# Patient Record
Sex: MALE | Race: WHITE | NOT HISPANIC OR LATINO | Employment: OTHER | URBAN - METROPOLITAN AREA
[De-identification: names, ages, dates, MRNs, and addresses within clinical notes are randomized per-mention and may not be internally consistent; named-entity substitution may affect disease eponyms.]

---

## 2017-08-17 RX ORDER — DULOXETIN HYDROCHLORIDE 30 MG/1
CAPSULE, DELAYED RELEASE ORAL
Qty: 30 CAPSULE | Refills: 11 | Status: SHIPPED | OUTPATIENT
Start: 2017-08-17 | End: 2018-08-09 | Stop reason: SDUPTHER

## 2017-10-26 RX ORDER — BACLOFEN 10 MG/1
TABLET ORAL
Qty: 90 TABLET | Refills: 4 | Status: SHIPPED | OUTPATIENT
Start: 2017-10-26 | End: 2018-08-09 | Stop reason: SDUPTHER

## 2018-02-06 ENCOUNTER — TELEPHONE (OUTPATIENT)
Dept: NEUROLOGY | Facility: CLINIC | Age: 68
End: 2018-02-06

## 2018-02-06 DIAGNOSIS — G62.9 NEUROPATHY: ICD-10-CM

## 2018-02-06 DIAGNOSIS — M21.379 FOOT-DROP, UNSPECIFIED LATERALITY: Primary | ICD-10-CM

## 2018-02-06 NOTE — TELEPHONE ENCOUNTER
----- Message from Daniela Cross sent at 2018 11:15 AM CST -----  Contact: Salma/spouse  Lupillo Caldera Sr.  MRN: [id number]  : 1950  PCP: John Cross  Home Phone      [phone]  Work Phone      Not on file.  Mobile          [phone]      MESSAGE: The patient's spouse is requesting an order for a leg brace for the patient. She states the brace he had broke and he cannot walk without it. The order needs to be sent to [facility] fax:[phone].  [phone]

## 2018-02-06 NOTE — TELEPHONE ENCOUNTER
Patient needs an order for a new brace, he has a follow up appointment with Rosy tomorrow he can  the script then.

## 2018-02-07 ENCOUNTER — OFFICE VISIT (OUTPATIENT)
Dept: NEUROLOGY | Facility: CLINIC | Age: 68
End: 2018-02-07
Payer: MEDICARE

## 2018-02-07 VITALS
HEART RATE: 84 BPM | SYSTOLIC BLOOD PRESSURE: 132 MMHG | BODY MASS INDEX: 34.56 KG/M2 | RESPIRATION RATE: 16 BRPM | WEIGHT: 241.38 LBS | HEIGHT: 70 IN | DIASTOLIC BLOOD PRESSURE: 76 MMHG

## 2018-02-07 DIAGNOSIS — M54.16 LUMBAR RADICULOPATHY: Primary | ICD-10-CM

## 2018-02-07 DIAGNOSIS — G62.9 NEUROPATHY: ICD-10-CM

## 2018-02-07 DIAGNOSIS — M21.371 RIGHT FOOT DROP: ICD-10-CM

## 2018-02-07 PROBLEM — M21.379 FOOT DROP: Status: ACTIVE | Noted: 2018-02-07

## 2018-02-07 PROCEDURE — 99213 OFFICE O/P EST LOW 20 MIN: CPT | Mod: PBBFAC | Performed by: NURSE PRACTITIONER

## 2018-02-07 PROCEDURE — 99214 OFFICE O/P EST MOD 30 MIN: CPT | Mod: S$PBB | Performed by: NURSE PRACTITIONER

## 2018-02-07 PROCEDURE — 99999 PR PBB SHADOW E&M-EST. PATIENT-LVL III: CPT | Mod: PBBFAC,,, | Performed by: NURSE PRACTITIONER

## 2018-02-07 PROCEDURE — 99999 PR STA SHADOW: CPT | Mod: PBBFAC,,, | Performed by: NURSE PRACTITIONER

## 2018-02-07 NOTE — PROGRESS NOTES
HPI :  Lupillo Caldera Sr. is a 66 y.o. 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 Some component of RLS. 2011 MRI  L spine does show some NF narrowing but not significant stenosis.    He presents today for a follow up visit and to obtain a new AFO, as his AFO is broken. He denies any worsening to his right foot drop. He continues to receive mild relief from his lumbar complaints with Cymbalta, as well as his neuropathy, and Baclofen continues to help his RLS type complaints.     No right CTS complaints today.     Review of Systems   Constitutional: Negative for fever.   Eyes: Negative for double vision.   Respiratory: Negative for hemoptysis.    Cardiovascular: Negative for leg swelling.   Gastrointestinal: Negative for blood in stool.   Genitourinary: Negative for hematuria.   Musculoskeletal: Positive for back pain. Negative for falls.   Skin: Negative for rash.   Neurological: Positive for sensory change. Negative for dizziness and headaches.   Psychiatric/Behavioral: Negative for memory loss.     Exam:   Gen Appearance, well developed/nourished in no apparent distress  CV: 2+ distal pulses with no edema or swelling  Neuro:  MS: Awake, alert, Sustains attention. Recent/remote memory intact, Language is full to spontaneous speech/comprehension. Fund of Knowledge is full  CN: Optic discs are flat with normal vasculature, PERRL, Extraoccular movements and visual fields are full. Normal facial sensation and strength, Tongue and Palate are midline and strong. Shoulder Shrug symmetric and strong.  Motor: Normal bulk, tone decreaed in the right lower leg muscles, no abnormal movements. 5/5 strength bilateral upper/lower extremities with 2+ reflexes except 3/5 right foot and toe extensors (improved with gravity only to 4+/5) only and absent  right ankle jerks and no clonus  Sensation:  Intact to temp and and vibration  Gait: Normal stance, no ataxia; no AFO today-broken    MRI L spine 2016:  Status post L4-L5 fusion.  Lumbar spondylosis as above with moderate right neuroforaminal narrowing at L4-5.    EMG 2016:   1. Mild Right Carpal Tunnel Syndrome  2. Chronic Lumbar Radiculopathy at L5 on the right  3. 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      Labs:   2016 CMP, CBC, TSH, B12, SPEP/FRITZ: Unremarkable except mild elevation in fasting glucose (108)  12/2016 fasting glucose 99; HgA1c 5.1    Assessment/Recommendation: Lupillo Caldera Sr. is a 67 y.o. 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 Some component of RLS. 2011 MRI  L spine does show some NF narrowing but not significant stenosis.    At the last visit 2016: new right ankle/foot numbness and new left leg leg radicular symptoms. 2016 MRI L spine showed spondylosis and NF narrowing. 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     I recommend:  1. Rx written today for new AFO for foot drop.   2. Continue Cymbalta for radicular complaints and neuropathic complaints.   3. Continue Baclofen for RLS complaints.   4. He reports failing left CTR prior/ no specific treatment needed for right CTS at this time.  5. Continue baclofen at night.  This helps with muscle spasms and RLS.  Switching statin (with another provider) also helped prior.     RTC in 6 months

## 2018-08-09 ENCOUNTER — OFFICE VISIT (OUTPATIENT)
Dept: NEUROLOGY | Facility: CLINIC | Age: 68
End: 2018-08-09
Payer: MEDICARE

## 2018-08-09 VITALS
HEIGHT: 70 IN | SYSTOLIC BLOOD PRESSURE: 136 MMHG | WEIGHT: 245.81 LBS | BODY MASS INDEX: 35.19 KG/M2 | DIASTOLIC BLOOD PRESSURE: 76 MMHG | RESPIRATION RATE: 16 BRPM | HEART RATE: 78 BPM

## 2018-08-09 DIAGNOSIS — M21.371 RIGHT FOOT DROP: ICD-10-CM

## 2018-08-09 DIAGNOSIS — M54.16 LUMBAR RADICULOPATHY: Primary | ICD-10-CM

## 2018-08-09 DIAGNOSIS — M79.604 RIGHT LEG PAIN: ICD-10-CM

## 2018-08-09 DIAGNOSIS — G25.81 RESTLESS LEG SYNDROME: ICD-10-CM

## 2018-08-09 DIAGNOSIS — G62.9 NEUROPATHY: ICD-10-CM

## 2018-08-09 PROCEDURE — 99999 PR PBB SHADOW E&M-EST. PATIENT-LVL III: CPT | Mod: PBBFAC,,, | Performed by: NURSE PRACTITIONER

## 2018-08-09 PROCEDURE — 99213 OFFICE O/P EST LOW 20 MIN: CPT | Mod: PBBFAC | Performed by: NURSE PRACTITIONER

## 2018-08-09 PROCEDURE — 99214 OFFICE O/P EST MOD 30 MIN: CPT | Mod: S$PBB | Performed by: NURSE PRACTITIONER

## 2018-08-09 PROCEDURE — 99999 PR STA SHADOW: CPT | Mod: PBBFAC,,, | Performed by: NURSE PRACTITIONER

## 2018-08-09 RX ORDER — DULOXETIN HYDROCHLORIDE 30 MG/1
30 CAPSULE, DELAYED RELEASE ORAL DAILY
Qty: 30 CAPSULE | Refills: 11 | Status: SHIPPED | OUTPATIENT
Start: 2018-08-09 | End: 2019-07-09 | Stop reason: SDUPTHER

## 2018-08-09 RX ORDER — BACLOFEN 10 MG/1
TABLET ORAL
Qty: 90 TABLET | Refills: 3 | Status: SHIPPED | OUTPATIENT
Start: 2018-08-09 | End: 2019-07-09 | Stop reason: SDUPTHER

## 2018-08-09 RX ORDER — CHOLECALCIFEROL (VITAMIN D3) 125 MCG
2 CAPSULE ORAL DAILY PRN
COMMUNITY
End: 2022-08-18

## 2018-08-09 NOTE — PROGRESS NOTES
HPI :  Lupillo Caldera Sr. is a 66 y.o. 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 Some component of RLS. 2011 MRI  L spine does show some NF narrowing but not significant stenosis.    He presents today for a follow up visit. He obtained a new AFO since his last visit, which is helpful. Foot drop overall unchanged. He has some lumbar tension, and would like to work on strengthening his lower back and core with PT. He also continues with right leg pain.     Baclofen continues to help his RLS type complaints.     Bilateral CTS complaints today. He is not dropping items, but difficulty opening items at times.     Review of Systems   Constitutional: Negative for fever.   Eyes: Negative for double vision.   Respiratory: Negative for hemoptysis.    Cardiovascular: Negative for leg swelling.   Gastrointestinal: Negative for blood in stool.   Genitourinary: Negative for hematuria.   Musculoskeletal: Positive for back pain. Negative for falls.   Skin: Negative for rash.   Neurological: Positive for sensory change. Negative for dizziness and headaches.   Psychiatric/Behavioral: Negative for memory loss.     Exam:   Gen Appearance, well developed/nourished in no apparent distress  CV: 2+ distal pulses with no edema or swelling  Neuro:  MS: Awake, alert, Sustains attention. Recent/remote memory intact, Language is full to spontaneous speech/comprehension. Fund of Knowledge is full  CN: Optic discs are flat with normal vasculature, PERRL, Extraoccular movements and visual fields are full. Normal facial sensation and strength, Tongue and Palate are midline and strong. Shoulder Shrug symmetric and strong.  Motor: Normal bulk, tone decreaed in the right lower leg muscles, no abnormal movements. 5/5 strength bilateral upper/lower extremities with 2+  reflexes except 3/5 right foot and toe extensors (improved with gravity only to 4+/5) only and absent right ankle jerks and no clonus  Sensation:  Intact to temp and and vibration  Gait: Normal stance, no ataxia; no AFO today-broken    MRI L spine 2016:  Status post L4-L5 fusion.  Lumbar spondylosis as above with moderate right neuroforaminal narrowing at L4-5.    EMG 2016:   1. Mild Right Carpal Tunnel Syndrome  2. Chronic Lumbar Radiculopathy at L5 on the right  3. 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      Labs:   2016 CMP, CBC, TSH, B12, SPEP/FRITZ: Unremarkable except mild elevation in fasting glucose (108)  12/2016 fasting glucose 99; HgA1c 5.1    Assessment/Recommendation: Lupillo Caldera Sr. is a 67 y.o. 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 Some component of RLS. 2011 MRI  L spine does show some NF narrowing but not significant stenosis. In 2016: new right ankle/foot numbness and new left leg leg radicular symptoms. 2016 MRI L spine showed spondylosis and NF narrowing. 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     I recommend:  1. Rx written today for new AFO for foot drop.   2. Continue Cymbalta for radicular complaints and neuropathic complaints.   3. Continue Baclofen for RLS complaints. Switching statin (with another provider) also helped prior.   4. He reports failing left CTR prior, and worsening right CT complaints. CT injections discussed, but he declines, due to fear of needles.     RTC in 6 months

## 2019-03-29 ENCOUNTER — TELEPHONE (OUTPATIENT)
Dept: NEUROLOGY | Facility: CLINIC | Age: 69
End: 2019-03-29

## 2019-03-29 NOTE — TELEPHONE ENCOUNTER
Spoke with patient states that he has noticed that the numbness is worse in his right leg since last visit. Explained to patient that we needed to get him back in for an evaluation for this problem, voiced understanding. Appointment scheduled next week with Rosy.

## 2019-03-29 NOTE — TELEPHONE ENCOUNTER
----- Message from Sudha Ramirez sent at 3/29/2019 10:22 AM CDT -----  Contact: PATIENT  Lupillo Caldera Sr.  MRN: [id number]  : 1950  PCP: Clint De  Home Phone      [phone]  Work Phone      Not on file.  Mobile          [phone]      MESSAGE: Patient states that he is having numbness to his (R) leg that seems to be getting worse.        Phone: [phone]

## 2019-04-04 ENCOUNTER — OFFICE VISIT (OUTPATIENT)
Dept: NEUROLOGY | Facility: CLINIC | Age: 69
End: 2019-04-04
Payer: MEDICARE

## 2019-04-04 VITALS
SYSTOLIC BLOOD PRESSURE: 162 MMHG | HEART RATE: 72 BPM | BODY MASS INDEX: 36.05 KG/M2 | RESPIRATION RATE: 16 BRPM | HEIGHT: 71 IN | DIASTOLIC BLOOD PRESSURE: 82 MMHG | WEIGHT: 257.5 LBS

## 2019-04-04 DIAGNOSIS — R20.0 RIGHT LEG NUMBNESS: ICD-10-CM

## 2019-04-04 DIAGNOSIS — E78.5 HYPERLIPIDEMIA, UNSPECIFIED HYPERLIPIDEMIA TYPE: ICD-10-CM

## 2019-04-04 DIAGNOSIS — M54.16 LUMBAR RADICULOPATHY: Primary | ICD-10-CM

## 2019-04-04 DIAGNOSIS — M21.371 RIGHT FOOT DROP: ICD-10-CM

## 2019-04-04 DIAGNOSIS — G62.9 NEUROPATHY: ICD-10-CM

## 2019-04-04 DIAGNOSIS — G25.81 RESTLESS LEG SYNDROME: ICD-10-CM

## 2019-04-04 DIAGNOSIS — M79.604 RIGHT LEG PAIN: ICD-10-CM

## 2019-04-04 DIAGNOSIS — I10 HYPERTENSION, UNSPECIFIED TYPE: ICD-10-CM

## 2019-04-04 PROCEDURE — 99999 PR PBB SHADOW E&M-EST. PATIENT-LVL V: CPT | Mod: PBBFAC,,, | Performed by: NURSE PRACTITIONER

## 2019-04-04 PROCEDURE — 99999 PR PBB SHADOW E&M-EST. PATIENT-LVL V: ICD-10-PCS | Mod: PBBFAC,,, | Performed by: NURSE PRACTITIONER

## 2019-04-04 PROCEDURE — 3079F PR MOST RECENT DIASTOLIC BLOOD PRESSURE 80-89 MM HG: ICD-10-PCS | Mod: CPTII,S$GLB,, | Performed by: NURSE PRACTITIONER

## 2019-04-04 PROCEDURE — 3077F PR MOST RECENT SYSTOLIC BLOOD PRESSURE >= 140 MM HG: ICD-10-PCS | Mod: CPTII,S$GLB,, | Performed by: NURSE PRACTITIONER

## 2019-04-04 PROCEDURE — 3077F SYST BP >= 140 MM HG: CPT | Mod: CPTII,S$GLB,, | Performed by: NURSE PRACTITIONER

## 2019-04-04 PROCEDURE — 99214 PR OFFICE/OUTPT VISIT, EST, LEVL IV, 30-39 MIN: ICD-10-PCS | Mod: S$GLB,,, | Performed by: NURSE PRACTITIONER

## 2019-04-04 PROCEDURE — 99214 OFFICE O/P EST MOD 30 MIN: CPT | Mod: S$GLB,,, | Performed by: NURSE PRACTITIONER

## 2019-04-04 PROCEDURE — 3079F DIAST BP 80-89 MM HG: CPT | Mod: CPTII,S$GLB,, | Performed by: NURSE PRACTITIONER

## 2019-04-04 RX ORDER — DIAZEPAM 2 MG/1
2 TABLET ORAL SEE ADMIN INSTRUCTIONS
Qty: 2 TABLET | Refills: 0 | Status: SHIPPED | OUTPATIENT
Start: 2019-04-04 | End: 2019-07-09

## 2019-04-04 RX ORDER — LISINOPRIL 20 MG/1
20 TABLET ORAL DAILY
COMMUNITY
Start: 2019-03-18 | End: 2023-11-15

## 2019-04-04 NOTE — PROGRESS NOTES
HPI :  Lupillo Caldera Sr. is a 68 y.o. 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 Some component of RLS. 2011 MRI  L spine does show some NF narrowing but not significant stenosis.    He presents today with complaint of worsening pain and paresthesias to his RLE since returning to work one month ago. He does carpentry work, and stands up all day cutting wood. When he changes positions or sits, his RLE complaints resolve. No lumbar pain at this time. He also has RLE complaints after walking for a prolonged period of time, and is unable to grocery shop with his wife currently because of this. Last MRI L-spine in 2016.     He was referred to PT at his last visit for lumbar tension and desire for core strengthening, but he was unable to proceed at that time, due to insurance coverage/cost; however, he has new insurance coverage now.     He obtained a new AFO since his last visit, which is helpful. Foot drop overall unchanged.     Baclofen continues to help his RLS type complaints.     Bilateral CTS complaints ongoing. He is not dropping items, but difficulty opening items at times. Declines CT injection.     BP in 160's today, despite medication compliance.     Review of Systems   Constitutional: Negative for fever.   Eyes: Negative for double vision.   Respiratory: Negative for hemoptysis.    Cardiovascular: Negative for leg swelling.   Gastrointestinal: Negative for blood in stool.   Genitourinary: Negative for hematuria.   Musculoskeletal: Positive for back pain. Negative for falls.   Skin: Negative for rash.   Neurological: Positive for sensory change. Negative for dizziness and headaches.   Psychiatric/Behavioral: Negative for memory loss.     Exam:   Gen Appearance, well developed/nourished in no apparent distress  CV: 2+ distal pulses  with no edema or swelling  Neuro:  MS: Awake, alert, Sustains attention. Recent/remote memory intact, Language is full to spontaneous speech/comprehension. Fund of Knowledge is full  CN: Optic discs are flat with normal vasculature, PERRL, Extraoccular movements and visual fields are full. Normal facial sensation and strength, Tongue and Palate are midline and strong. Shoulder Shrug symmetric and strong.  Motor: Normal bulk, tone decreaed in the right lower leg muscles, no abnormal movements. 5/5 strength bilateral upper/lower extremities, except 3/5 right foot and toe extensors (improved with gravity only to 4+/5) with 1+ reflexes and absent right ankle jerks and no clonus  Sensation:  Intact to temp and and vibration, except reduced to median nerve distribution bilaterally.   Gait: Normal stance, no ataxia; no AFO today-broken    Imaging:  MRI L spine 2016:  Status post L4-L5 fusion.  Lumbar spondylosis as above with moderate right neuroforaminal narrowing at L4-5.    EMG 2016:   1. Mild Right Carpal Tunnel Syndrome  2. Chronic Lumbar Radiculopathy at L5 on the right  3. 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      Labs:   2016 CMP, CBC, TSH, B12, SPEP/FRITZ: Unremarkable except mild elevation in fasting glucose (108)  12/2016 fasting glucose 99; HgA1c 5.1    Assessment/Recommendation: Lupillo Caldera Sr. is a 68 y.o. 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 Some component of RLS. 2011 MRI  L spine does show some NF narrowing but not significant stenosis. In 2016: new right ankle/foot numbness and new left leg leg radicular symptoms. 2016 MRI L spine showed spondylosis and NF narrowing. 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     I recommend:  1. Update MRI L-spine, then consider PT versus pain management referral afterwards. Complaints in right L5/S1 distribution. Valium Rx given.   2. He continues compliance with right AFO for right foot drop.   3. Continue Cymbalta for radicular complaints and neuropathic complaints.   4. Continue Baclofen for RLS complaints. Switching statin (with another provider) also helped prior.   5. He reports failing left CTR prior, and has worsening right CT complaints. CT injections discussed, but he declines, due to fear of needles. Encouraged bracing.   6. Advised to take BP at home bid for 2 weeks then see Cardiology for any ongoing BP above 140's. Medication recently changed, due to side effect of sweating.     RTC in 6 months

## 2019-04-04 NOTE — PATIENT INSTRUCTIONS
Take blood pressure at home twice a day for 2 weeks, and report any ongoing BP over 140's to Cardiology.

## 2019-04-10 ENCOUNTER — HOSPITAL ENCOUNTER (OUTPATIENT)
Dept: RADIOLOGY | Facility: HOSPITAL | Age: 69
Discharge: HOME | End: 2019-04-10
Attending: NURSE PRACTITIONER
Payer: MEDICARE

## 2019-04-10 DIAGNOSIS — R20.0 RIGHT LEG NUMBNESS: ICD-10-CM

## 2019-04-10 DIAGNOSIS — M79.604 RIGHT LEG PAIN: ICD-10-CM

## 2019-04-10 DIAGNOSIS — M54.16 LUMBAR RADICULOPATHY: ICD-10-CM

## 2019-04-10 PROCEDURE — 72148 MRI LUMBAR SPINE W/O DYE: CPT | Mod: TC

## 2019-04-10 PROCEDURE — 72148 MRI LUMBAR SPINE WITHOUT CONTRAST: ICD-10-PCS | Mod: 26,,, | Performed by: RADIOLOGY

## 2019-04-10 PROCEDURE — 72148 MRI LUMBAR SPINE W/O DYE: CPT | Mod: 26,,, | Performed by: RADIOLOGY

## 2019-04-11 DIAGNOSIS — M54.16 LUMBAR RADICULOPATHY: Primary | ICD-10-CM

## 2019-05-20 ENCOUNTER — TELEPHONE (OUTPATIENT)
Dept: NEUROLOGY | Facility: CLINIC | Age: 69
End: 2019-05-20

## 2019-05-20 DIAGNOSIS — M79.604 RIGHT LEG PAIN: ICD-10-CM

## 2019-05-20 DIAGNOSIS — M54.16 LUMBAR RADICULOPATHY: Primary | ICD-10-CM

## 2019-05-20 NOTE — TELEPHONE ENCOUNTER
----- Message from Valeri Osullivan sent at 2019 10:22 AM CDT -----  Contact: Self  Lupillo Caldera Sr.  MRN: [id number]  : 1950  PCP: Clint De  Home Phone      [phone]  Work Phone      Not on file.  Mobile          [phone]      MESSAGE: Patient states he does not want to see Pain management, he would like to try Physical therapy instead, he would like to know if that would be an option for him. Please advise    Phone: [phone]

## 2019-07-09 ENCOUNTER — OFFICE VISIT (OUTPATIENT)
Dept: NEUROLOGY | Facility: CLINIC | Age: 69
End: 2019-07-09
Payer: MEDICARE

## 2019-07-09 VITALS
HEIGHT: 70 IN | DIASTOLIC BLOOD PRESSURE: 74 MMHG | RESPIRATION RATE: 16 BRPM | HEART RATE: 92 BPM | SYSTOLIC BLOOD PRESSURE: 130 MMHG | WEIGHT: 246.25 LBS | BODY MASS INDEX: 35.25 KG/M2

## 2019-07-09 DIAGNOSIS — M54.16 LUMBAR RADICULOPATHY: Primary | ICD-10-CM

## 2019-07-09 DIAGNOSIS — I10 HYPERTENSION, UNSPECIFIED TYPE: ICD-10-CM

## 2019-07-09 DIAGNOSIS — R20.0 RIGHT LEG NUMBNESS: ICD-10-CM

## 2019-07-09 DIAGNOSIS — M21.371 RIGHT FOOT DROP: ICD-10-CM

## 2019-07-09 DIAGNOSIS — M18.11 OSTEOARTHRITIS OF RIGHT THUMB: ICD-10-CM

## 2019-07-09 DIAGNOSIS — M79.604 RIGHT LEG PAIN: ICD-10-CM

## 2019-07-09 DIAGNOSIS — E78.5 HYPERLIPIDEMIA, UNSPECIFIED HYPERLIPIDEMIA TYPE: ICD-10-CM

## 2019-07-09 DIAGNOSIS — G25.81 RESTLESS LEG SYNDROME: ICD-10-CM

## 2019-07-09 DIAGNOSIS — G62.9 NEUROPATHY: ICD-10-CM

## 2019-07-09 PROCEDURE — 99214 PR OFFICE/OUTPT VISIT, EST, LEVL IV, 30-39 MIN: ICD-10-PCS | Mod: S$GLB,,, | Performed by: NURSE PRACTITIONER

## 2019-07-09 PROCEDURE — 3075F SYST BP GE 130 - 139MM HG: CPT | Mod: CPTII,S$GLB,, | Performed by: NURSE PRACTITIONER

## 2019-07-09 PROCEDURE — 99999 PR PBB SHADOW E&M-EST. PATIENT-LVL III: ICD-10-PCS | Mod: PBBFAC,,, | Performed by: NURSE PRACTITIONER

## 2019-07-09 PROCEDURE — 99499 RISK ADDL DX/OHS AUDIT: ICD-10-PCS | Mod: S$GLB,,, | Performed by: NURSE PRACTITIONER

## 2019-07-09 PROCEDURE — 99999 PR PBB SHADOW E&M-EST. PATIENT-LVL III: CPT | Mod: PBBFAC,,, | Performed by: NURSE PRACTITIONER

## 2019-07-09 PROCEDURE — 99499 UNLISTED E&M SERVICE: CPT | Mod: S$GLB,,, | Performed by: NURSE PRACTITIONER

## 2019-07-09 PROCEDURE — 3075F PR MOST RECENT SYSTOLIC BLOOD PRESS GE 130-139MM HG: ICD-10-PCS | Mod: CPTII,S$GLB,, | Performed by: NURSE PRACTITIONER

## 2019-07-09 PROCEDURE — 3078F DIAST BP <80 MM HG: CPT | Mod: CPTII,S$GLB,, | Performed by: NURSE PRACTITIONER

## 2019-07-09 PROCEDURE — 3078F PR MOST RECENT DIASTOLIC BLOOD PRESSURE < 80 MM HG: ICD-10-PCS | Mod: CPTII,S$GLB,, | Performed by: NURSE PRACTITIONER

## 2019-07-09 PROCEDURE — 99214 OFFICE O/P EST MOD 30 MIN: CPT | Mod: S$GLB,,, | Performed by: NURSE PRACTITIONER

## 2019-07-09 RX ORDER — BACLOFEN 10 MG/1
TABLET ORAL
Qty: 90 TABLET | Refills: 1 | Status: SHIPPED | OUTPATIENT
Start: 2019-07-09 | End: 2020-04-28

## 2019-07-09 RX ORDER — DULOXETIN HYDROCHLORIDE 30 MG/1
30 CAPSULE, DELAYED RELEASE ORAL DAILY
Qty: 90 CAPSULE | Refills: 1 | Status: SHIPPED | OUTPATIENT
Start: 2019-07-09 | End: 2020-03-26

## 2019-07-09 RX ORDER — DICLOFENAC SODIUM 10 MG/G
2 GEL TOPICAL DAILY
Qty: 1 TUBE | Refills: 5 | Status: SHIPPED | OUTPATIENT
Start: 2019-07-09 | End: 2022-08-18

## 2019-07-09 NOTE — PROGRESS NOTES
HPI :  Lupillo Caldera Sr. is a 68 y.o. 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 Some component of RLS. 2011 MRI  L spine does show some NF narrowing but not significant stenosis.    He presents today for a follow up visit. He was evaluated for worsening right lumbar radicular complaints at his last visit, and updated his MRI L-spine in 4/2019, which was essentially unchanged from his last study. He was referred to pain management, but decided not to proceed with this. He attended PT at [facility], which was very helpful, and his right lumbar complaints are resolved.     He lost 11 pounds since his last visit.     Foot drop overall unchanged. He continues to wear an AFO.     Baclofen continues to help his RLS type complaints.     Cymbalta continues to help his neuropathic complaints.     Bilateral CTS complaints ongoing. He is not dropping items, but difficulty opening items at times. Declines CT injection.     He sometimes has pain to his right thumb near his wrist, worse with increased working. His right thumb feels stiff at times-no numbness.     BP improved today-WNL.     Review of Systems   Constitutional: Negative for fever.   Eyes: Negative for double vision.   Respiratory: Negative for hemoptysis.    Cardiovascular: Negative for leg swelling.   Gastrointestinal: Negative for blood in stool.   Genitourinary: Negative for hematuria.   Musculoskeletal: Positive for joint pain. Negative for back pain and falls.   Skin: Negative for rash.   Neurological: Positive for sensory change. Negative for dizziness and headaches.   Psychiatric/Behavioral: Negative for memory loss.     Exam:   Gen Appearance, well developed/nourished in no apparent distress  CV: 2+ distal pulses with no edema or swelling  Neuro:  MS: Awake, alert,  Sustains attention. Recent/remote memory intact, Language is full to spontaneous speech/comprehension. Fund of Knowledge is full  CN: Optic discs are flat with normal vasculature, PERRL, Extraoccular movements and visual fields are full. Normal facial sensation and strength, Tongue and Palate are midline and strong. Shoulder Shrug symmetric and strong.  Motor: Normal bulk, tone decreaed in the right lower leg muscles, no abnormal movements. 5/5 strength bilateral upper/lower extremities, except 3/5 right foot and toe extensors (improved with gravity only to 4+/5) with 1+ reflexes and absent right ankle jerks and no clonus  Sensation:  Intact to temp and and vibration, except reduced to median nerve distribution bilaterally.   Gait: Normal stance, no ataxia; no AFO today-broken    Imaging:  MRI L-spine 4/2019:   Status post instrumented fusion of L4-L5.  Osseous fusion noted across the facet joints.    Alignment: Grade 1 anterolisthesis noted at L4-L5, unchanged.    Vertebrae: Normal marrow signal, noting several hemangiomas.  No fracture.    Discs: Multilevel disc desiccation noted.    Cord: Normal.  Conus terminates at L1-L2.    Degenerative findings:    T12-L1: No spinal canal stenosis or neural foraminal narrowing.    L1-L2: No spinal canal stenosis or neural foraminal narrowing.    L2-L3: Circumferential disc bulge and mild bilateral facet arthropathy noted.  No spinal canal stenosis or neural foraminal narrowing.    L3-L4: Circumferential disc bulge and mild bilateral facet arthropathy result in mild spinal canal stenosis.    L4-L5: Status post discectomy and fusion.  Moderate narrowing of the right neural foramen noted.    L5-S1: Circumferential disc bulge and mild bilateral facet arthropathy noted.  No spinal canal stenosis or neural foraminal narrowing.    Paraspinal muscles & soft tissues: Peripelvic left renal cyst noted.      Impression       1. Status post L4-L5 fusion.  Multilevel degenerative changes  of the lumbar spine overall similar to prior study.     MRI L spine 2016:  Status post L4-L5 fusion.  Lumbar spondylosis as above with moderate right neuroforaminal narrowing at L4-5.    EMG 2016:   1. Mild Right Carpal Tunnel Syndrome  2. Chronic Lumbar Radiculopathy at L5 on the right  3. 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      Labs:   2016 CMP, CBC, TSH, B12, SPEP/FRITZ: Unremarkable except mild elevation in fasting glucose (108)  12/2016 fasting glucose 99; HgA1c 5.1    Assessment/Recommendation: Lupillo Caldera Sr. is a 68 y.o. 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 Some component of RLS. 2011 MRI  L spine does show some NF narrowing but not significant stenosis. In 2016: new right ankle/foot numbness and new left leg leg radicular symptoms. 2016 MRI L spine showed spondylosis and NF narrowing. 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     I recommend:  1. Start Voltaren gel for right thumb pain, which I suspect to be OA related. This is not consistent with right CTS.   2. Updated MRI L-spine unchanged from prior study. His right lumbar complaints resolved with PT per Physiblanca Gibbons.   3. He continues compliance with right AFO for right foot drop. Rx written for new AFO today.   4. Continue Cymbalta for radicular complaints and neuropathic complaints.   5. Continue Baclofen for RLS complaints. Switching statin (with another provider) also helped prior.   6. He reports failing left CTR prior.  7. Advised to take BP at home bid for 2 weeks then see Cardiology for any  ongoing BP above 140's. Medication recently changed, due to side effect of sweating.     RTC in 6 months

## 2019-07-09 NOTE — PROGRESS NOTES
Patient, Lupillo Caldera Sr. (MRN #[id number]), presented with a recorded BMI of 35.33 kg/m^2 and a documented comorbidity(s):  - Hypertension  - Hyperlipidemia  to which the severe obesity is a contributing factor. This is consistent with the definition of severe obesity (BMI 35.0-39.9) with comorbidity (ICD-10 E66.01, Z68.35). The patient's severe obesity was monitored, evaluated, addressed and/or treated. This addendum to the medical record is made on 07/09/2019.

## 2019-11-18 ENCOUNTER — TELEPHONE (OUTPATIENT)
Dept: NEUROLOGY | Facility: CLINIC | Age: 69
End: 2019-11-18

## 2019-11-18 DIAGNOSIS — M21.371 RIGHT FOOT DROP: Primary | ICD-10-CM

## 2019-11-18 NOTE — TELEPHONE ENCOUNTER
----- Message from Sudha Ramirez sent at 2019 12:57 PM CST -----  Contact: WIFE - SEA Caldera Sr.  MRN: [id number]  : 1950  PCP: Clint De  Home Phone      [phone]  Work Phone      Not on file.  Mobile          [phone]      MESSAGE: Wife is checking to see if patient needs an appointment to get a new brace for his leg.  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        Phone: [phone]

## 2019-11-18 NOTE — TELEPHONE ENCOUNTER
Patient needs order for new AFO. He is scheduled for 6 month follow up in January 2020. Please advise if an appointment is needed for the order.

## 2019-11-19 ENCOUNTER — TELEPHONE (OUTPATIENT)
Dept: NEUROLOGY | Facility: CLINIC | Age: 69
End: 2019-11-19

## 2019-11-19 NOTE — TELEPHONE ENCOUNTER
Orders placed for new AFO. We can try just sending the order first, before scheduling another follow up visit just for this.

## 2019-11-19 NOTE — TELEPHONE ENCOUNTER
----- Message from Teodora Apodaca sent at 2019 10:18 AM CST -----  Contact: tray - wife  Lupillo Caldera Sr.  MRN: [id number]  : 1950  PCP: Clint De  Home Phone      [phone]  Work Phone      Not on file.  Mobile          [phone]      MESSAGE:  Wife said that she knows a referral was sent in (not sure whats the company name) for the patient's brace. However, they need his insurance info from us as well as a note saying that he needs & why he needs the brace.   Fax: [phone] (company fax)   Attn: Barbie       [phone]

## 2020-01-13 ENCOUNTER — OFFICE VISIT (OUTPATIENT)
Dept: NEUROLOGY | Facility: CLINIC | Age: 70
End: 2020-01-13
Payer: MEDICARE

## 2020-01-13 VITALS
HEIGHT: 71 IN | DIASTOLIC BLOOD PRESSURE: 72 MMHG | RESPIRATION RATE: 18 BRPM | BODY MASS INDEX: 34.48 KG/M2 | SYSTOLIC BLOOD PRESSURE: 116 MMHG | WEIGHT: 246.25 LBS | HEART RATE: 70 BPM

## 2020-01-13 DIAGNOSIS — G62.9 NEUROPATHY: ICD-10-CM

## 2020-01-13 DIAGNOSIS — G25.81 RESTLESS LEG SYNDROME: ICD-10-CM

## 2020-01-13 DIAGNOSIS — M21.371 RIGHT FOOT DROP: ICD-10-CM

## 2020-01-13 DIAGNOSIS — M54.16 LUMBAR RADICULOPATHY: Primary | ICD-10-CM

## 2020-01-13 PROCEDURE — 99999 PR PBB SHADOW E&M-EST. PATIENT-LVL III: ICD-10-PCS | Mod: PBBFAC,,, | Performed by: PSYCHIATRY & NEUROLOGY

## 2020-01-13 PROCEDURE — 3078F DIAST BP <80 MM HG: CPT | Mod: CPTII,S$GLB,, | Performed by: PSYCHIATRY & NEUROLOGY

## 2020-01-13 PROCEDURE — 1159F PR MEDICATION LIST DOCUMENTED IN MEDICAL RECORD: ICD-10-PCS | Mod: S$GLB,,, | Performed by: PSYCHIATRY & NEUROLOGY

## 2020-01-13 PROCEDURE — 99214 OFFICE O/P EST MOD 30 MIN: CPT | Mod: S$GLB,,, | Performed by: PSYCHIATRY & NEUROLOGY

## 2020-01-13 PROCEDURE — 3074F SYST BP LT 130 MM HG: CPT | Mod: CPTII,S$GLB,, | Performed by: PSYCHIATRY & NEUROLOGY

## 2020-01-13 PROCEDURE — 99999 PR PBB SHADOW E&M-EST. PATIENT-LVL III: CPT | Mod: PBBFAC,,, | Performed by: PSYCHIATRY & NEUROLOGY

## 2020-01-13 PROCEDURE — 3074F PR MOST RECENT SYSTOLIC BLOOD PRESSURE < 130 MM HG: ICD-10-PCS | Mod: CPTII,S$GLB,, | Performed by: PSYCHIATRY & NEUROLOGY

## 2020-01-13 PROCEDURE — 1126F AMNT PAIN NOTED NONE PRSNT: CPT | Mod: S$GLB,,, | Performed by: PSYCHIATRY & NEUROLOGY

## 2020-01-13 PROCEDURE — 99214 PR OFFICE/OUTPT VISIT, EST, LEVL IV, 30-39 MIN: ICD-10-PCS | Mod: S$GLB,,, | Performed by: PSYCHIATRY & NEUROLOGY

## 2020-01-13 PROCEDURE — 1159F MED LIST DOCD IN RCRD: CPT | Mod: S$GLB,,, | Performed by: PSYCHIATRY & NEUROLOGY

## 2020-01-13 PROCEDURE — 3078F PR MOST RECENT DIASTOLIC BLOOD PRESSURE < 80 MM HG: ICD-10-PCS | Mod: CPTII,S$GLB,, | Performed by: PSYCHIATRY & NEUROLOGY

## 2020-01-13 PROCEDURE — 1126F PR PAIN SEVERITY QUANTIFIED, NO PAIN PRESENT: ICD-10-PCS | Mod: S$GLB,,, | Performed by: PSYCHIATRY & NEUROLOGY

## 2020-01-13 NOTE — PROGRESS NOTES
HPI :  Lupillo Caldera Sr. is a 66 y.o. male     Since the last visit with me, the patient has been seeing NP, Rosy Cross, in Neurology in this clinic  A new AFO was ordered after the last visit  Volteren gel was given for right thumb pain and this helps and this is more tolerable now.    He complains of right foot and ankle burning pain. He feels burning in the great toe all the way up to the ankle. Can happen at any point on the day, but worse with prolonged walking. Left foot has some numbness and there is more constant numbness in the right foot.  No radicular pain and back pain is well tolerated.Brace is fitting well    RLS is well controlled      Review of Systems   Constitutional: Negative for fever.   Eyes: Negative for double vision.   Respiratory: Negative for hemoptysis.    Cardiovascular: Negative for leg swelling.   Gastrointestinal: Negative for blood in stool.   Genitourinary: Negative for hematuria.   Musculoskeletal: Negative for falls.   Skin: Negative for rash.   Neurological: Negative for dizziness and headaches.   Psychiatric/Behavioral: Negative for memory loss.       Exam:   Gen Appearance, well developed/nourished in no apparent distress  CV: 2+ distal pulses with no edema or swelling  Neuro:  MS: Awake, alert, Sustains attention. Recent/remote memory intact, Language is full to spontaneous speech/comprehension. Fund of Knowledge is full  CN: Optic discs are flat with normal vasculature, PERRL, Extraoccular movements and visual fields are full. Normal facial sensation and strength, Tongue and Palate are midline and strong. Shoulder Shrug symmetric and strong.  Motor: Normal bulk, tone decreaed in the right lower leg muscles, no abnormal movements. 5/5 strength bilateral upper/lower extremities with 2+ reflexes except 3/5 right foot and toe extensors (improved with gravity only to 4+/5) (in afo today) only and absent right ankle jerks and no clonus  Sensation:  Intact to temp and and  vibration  Gait: Normal stance, no ataxia with afo in place-- there is no compression on the medial ankle with this brace.       MRI L spine 2016: Status post L4-L5 fusion.  Lumbar spondylosis as above with moderate right neuroforaminal narrowing at L4-5.    EMG 2016: 1. Mild Right Carpal Tunnel Syndrome  2. Chronic Lumbar Radiculopathy at L5 on the right  3. 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        Labs: 2016 CMP, CBC, TSH, B12, SPEP/FRITZ: Unremarkable except mild elevation in fasting glucose (108)  12/2016 fasting glucose improved with normal A1C    Assessment/Recommendation: Lupillo Caldera Sr. is a 69 y.o. male who is s/p 2 lumbar spinal surgeries now with Residual foot drop from right L5 radiduluopathy with ongoing rennervation by  EMG and prominent cramps/spasms in the legs--failed lower back syndrome. Some component of RLS.  2016 MRI L spine showed spondylosis and NF narrowing. 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 Voltaren gel for right thumb pain, which I suspect to be OA related, is helpful. Continue this PRN. This is not consistent with right CTS.   2. His right lumbar complaints resolved with PT per Paulina Gibbons.   3. He continues compliance with right AFO for right foot drop.  4. Continue Cymbalta for radicular complaints and neuropathic complaints.   -Could raise dose of Cymbalta if desired. He declines for now  - Patient reports poor tolerance of gabapentin prior.  5. Continue Baclofen for RLS complaints. Switching statin (with another provider) also helped prior.   6. He reports failing left CTR prior. No specific treatment needed for right CTS at this time   7. Fasting blood sugar abnormality note prior had improved. Advised yearly lab follow up with PCP or  cardiologist    RTC 6 months

## 2020-03-26 DIAGNOSIS — M54.16 LUMBAR RADICULOPATHY: ICD-10-CM

## 2020-03-26 DIAGNOSIS — G62.9 NEUROPATHY: ICD-10-CM

## 2020-03-26 RX ORDER — DULOXETIN HYDROCHLORIDE 30 MG/1
CAPSULE, DELAYED RELEASE ORAL
Qty: 90 CAPSULE | Refills: 1 | Status: SHIPPED | OUTPATIENT
Start: 2020-03-26 | End: 2020-07-07

## 2020-04-26 DIAGNOSIS — G25.81 RESTLESS LEG SYNDROME: ICD-10-CM

## 2020-04-28 RX ORDER — BACLOFEN 10 MG/1
TABLET ORAL
Qty: 90 TABLET | Refills: 0 | Status: SHIPPED | OUTPATIENT
Start: 2020-04-28 | End: 2020-07-28

## 2020-07-13 ENCOUNTER — OFFICE VISIT (OUTPATIENT)
Dept: NEUROLOGY | Facility: CLINIC | Age: 70
End: 2020-07-13
Payer: MEDICARE

## 2020-07-13 VITALS
BODY MASS INDEX: 34.57 KG/M2 | HEART RATE: 78 BPM | WEIGHT: 246.94 LBS | HEIGHT: 71 IN | TEMPERATURE: 97 F | SYSTOLIC BLOOD PRESSURE: 130 MMHG | RESPIRATION RATE: 16 BRPM | DIASTOLIC BLOOD PRESSURE: 76 MMHG

## 2020-07-13 DIAGNOSIS — M25.561 CHRONIC PAIN OF RIGHT KNEE: Primary | ICD-10-CM

## 2020-07-13 DIAGNOSIS — M21.371 RIGHT FOOT DROP: ICD-10-CM

## 2020-07-13 DIAGNOSIS — G25.81 RESTLESS LEG SYNDROME: ICD-10-CM

## 2020-07-13 DIAGNOSIS — G62.9 NEUROPATHY: ICD-10-CM

## 2020-07-13 DIAGNOSIS — M18.11 OSTEOARTHRITIS OF RIGHT THUMB: ICD-10-CM

## 2020-07-13 DIAGNOSIS — G89.29 CHRONIC PAIN OF RIGHT KNEE: Primary | ICD-10-CM

## 2020-07-13 DIAGNOSIS — M54.16 LUMBAR RADICULOPATHY: ICD-10-CM

## 2020-07-13 PROCEDURE — 3075F SYST BP GE 130 - 139MM HG: CPT | Mod: CPTII,S$GLB,, | Performed by: PSYCHIATRY & NEUROLOGY

## 2020-07-13 PROCEDURE — 99999 PR PBB SHADOW E&M-EST. PATIENT-LVL IV: ICD-10-PCS | Mod: PBBFAC,,, | Performed by: PSYCHIATRY & NEUROLOGY

## 2020-07-13 PROCEDURE — 3008F PR BODY MASS INDEX (BMI) DOCUMENTED: ICD-10-PCS | Mod: CPTII,S$GLB,, | Performed by: PSYCHIATRY & NEUROLOGY

## 2020-07-13 PROCEDURE — 99214 PR OFFICE/OUTPT VISIT, EST, LEVL IV, 30-39 MIN: ICD-10-PCS | Mod: S$GLB,,, | Performed by: PSYCHIATRY & NEUROLOGY

## 2020-07-13 PROCEDURE — 3075F PR MOST RECENT SYSTOLIC BLOOD PRESS GE 130-139MM HG: ICD-10-PCS | Mod: CPTII,S$GLB,, | Performed by: PSYCHIATRY & NEUROLOGY

## 2020-07-13 PROCEDURE — 3008F BODY MASS INDEX DOCD: CPT | Mod: CPTII,S$GLB,, | Performed by: PSYCHIATRY & NEUROLOGY

## 2020-07-13 PROCEDURE — 99214 OFFICE O/P EST MOD 30 MIN: CPT | Mod: S$GLB,,, | Performed by: PSYCHIATRY & NEUROLOGY

## 2020-07-13 PROCEDURE — 3078F PR MOST RECENT DIASTOLIC BLOOD PRESSURE < 80 MM HG: ICD-10-PCS | Mod: CPTII,S$GLB,, | Performed by: PSYCHIATRY & NEUROLOGY

## 2020-07-13 PROCEDURE — 1125F PR PAIN SEVERITY QUANTIFIED, PAIN PRESENT: ICD-10-PCS | Mod: S$GLB,,, | Performed by: PSYCHIATRY & NEUROLOGY

## 2020-07-13 PROCEDURE — 1159F MED LIST DOCD IN RCRD: CPT | Mod: S$GLB,,, | Performed by: PSYCHIATRY & NEUROLOGY

## 2020-07-13 PROCEDURE — 99999 PR PBB SHADOW E&M-EST. PATIENT-LVL IV: CPT | Mod: PBBFAC,,, | Performed by: PSYCHIATRY & NEUROLOGY

## 2020-07-13 PROCEDURE — 1125F AMNT PAIN NOTED PAIN PRSNT: CPT | Mod: S$GLB,,, | Performed by: PSYCHIATRY & NEUROLOGY

## 2020-07-13 PROCEDURE — 1159F PR MEDICATION LIST DOCUMENTED IN MEDICAL RECORD: ICD-10-PCS | Mod: S$GLB,,, | Performed by: PSYCHIATRY & NEUROLOGY

## 2020-07-13 PROCEDURE — 3078F DIAST BP <80 MM HG: CPT | Mod: CPTII,S$GLB,, | Performed by: PSYCHIATRY & NEUROLOGY

## 2020-07-13 RX ORDER — DULOXETIN HYDROCHLORIDE 30 MG/1
30 CAPSULE, DELAYED RELEASE ORAL DAILY
Qty: 90 CAPSULE | Refills: 3 | Status: SHIPPED | OUTPATIENT
Start: 2020-07-13 | End: 2021-04-13 | Stop reason: SDUPTHER

## 2020-07-13 NOTE — PROGRESS NOTES
HPI :  Lupillo Caldera Sr. is a 66 y.o. male     He has been having right knee pain for about 2 months more severe than usual. He has a history of knee surgery in 1980s, Dr Hudson Muñoz.  He has not injured the knee recently. Pain is in the anterior and posterior knee joint. Knows of chronic degenerative changes at the right knee joint.     Pain in the right foot and ankle burning pain is active only a little at night. This is tolerable.   No radicular pain and back pain is well tolerated.  Lumbar radicular type pain is not active, back pain is not active  AFO used well    RLS is well controlled  CTS right is not active  Thumb pain is responsive to volteren    Blood sugar has been good per him    Review of Systems   Constitutional: Negative for fever.   Eyes: Negative for double vision.   Respiratory: Negative for hemoptysis.    Cardiovascular: Negative for leg swelling.   Gastrointestinal: Negative for blood in stool.   Genitourinary: Negative for hematuria.   Musculoskeletal: Negative for falls.   Skin: Negative for rash.   Neurological: Negative for dizziness and headaches.   Psychiatric/Behavioral: Negative for memory loss.       Exam:   Gen Appearance, well developed/nourished in no apparent distress  CV: 2+ distal pulses with no edema or swelling  Neuro:  MS: Awake, alert, Sustains attention. Recent/remote memory intact, Language is full to spontaneous speech/comprehension. Fund of Knowledge is full  CN: Optic discs are flat with normal vasculature, PERRL, Extraoccular movements and visual fields are full. Normal facial sensation and strength, Tongue and Palate are midline and strong. Shoulder Shrug symmetric and strong.  Motor: Normal bulk, tone decreaed in the right lower leg muscles, no abnormal movements. 5/5 strength bilateral upper/lower extremities with 2+ reflexes except 3/5 right foot and toe extensors (improved with gravity only to 4+/5) (in afo today) only and absent right ankle jerks and no  clonus  Sensation:  Intact to temp and and vibration  Gait: Normal stance, no ataxia with afo in place-- there is no compression on the medial ankle with this brace.       MRI L spine 2016: Status post L4-L5 fusion.  Lumbar spondylosis as above with moderate right neuroforaminal narrowing at L4-5.    EMG 2016: 1. Mild Right Carpal Tunnel Syndrome  2. Chronic Lumbar Radiculopathy at L5 on the right  3. 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        Labs: 2016 CMP, CBC, TSH, B12, SPEP/FRITZ: Unremarkable except mild elevation in fasting glucose (108)  12/2016 fasting glucose improved with normal A1C    Assessment/Recommendation: Lupillo Caldera Sr. is a 69 y.o. male who is s/p 2 lumbar spinal surgeries now with Residual foot drop from right L5 radiduluopathy with ongoing rennervation by  EMG and prominent cramps/spasms in the legs--failed lower back syndrome. Some component of RLS.  2016 MRI L spine showed spondylosis and NF narrowing. 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 Voltaren gel for right thumb pain, which I suspect to be OA related, is helpful. Continue this PRN. This is not consistent with right CTS.   2. His right lumbar complaints resolved with PT per [facility] prior.   3. He continues compliance with right AFO for right foot drop.  4. Continue Cymbalta for radicular complaints and neuropathic complaints.   -Could raise dose of Cymbalta if desired/ declined prior  - Patient reports poor tolerance of gabapentin prior.  5. Continue Baclofen for RLS complaints. Switching statin (with another provider) also helped prior.   6. He reports failing left CTR prior. No specific treatment needed for right CTS at this time   7. Fasting blood sugar abnormality note prior had  improved. Advised yearly lab follow up with PCP or cardiologist  8. Refer to orthopedics for subacute on chronic right knee pain  RTC 6 months

## 2020-07-14 DIAGNOSIS — M25.562 PAIN IN BOTH KNEES, UNSPECIFIED CHRONICITY: Primary | ICD-10-CM

## 2020-07-14 DIAGNOSIS — M25.561 PAIN IN BOTH KNEES, UNSPECIFIED CHRONICITY: Primary | ICD-10-CM

## 2020-07-15 ENCOUNTER — TELEPHONE (OUTPATIENT)
Dept: ORTHOPEDICS | Facility: CLINIC | Age: 70
End: 2020-07-15

## 2020-07-15 ENCOUNTER — HOSPITAL ENCOUNTER (OUTPATIENT)
Dept: RADIOLOGY | Facility: HOSPITAL | Age: 70
Discharge: HOME | End: 2020-07-15
Attending: PHYSICIAN ASSISTANT
Payer: MEDICARE

## 2020-07-15 ENCOUNTER — OFFICE VISIT (OUTPATIENT)
Dept: ORTHOPEDICS | Facility: CLINIC | Age: 70
End: 2020-07-15
Payer: MEDICARE

## 2020-07-15 VITALS
BODY MASS INDEX: 34.35 KG/M2 | RESPIRATION RATE: 16 BRPM | WEIGHT: 245.38 LBS | SYSTOLIC BLOOD PRESSURE: 124 MMHG | DIASTOLIC BLOOD PRESSURE: 74 MMHG | HEIGHT: 71 IN | TEMPERATURE: 97 F | HEART RATE: 90 BPM

## 2020-07-15 DIAGNOSIS — M17.31 POST-TRAUMATIC OSTEOARTHRITIS OF RIGHT KNEE: ICD-10-CM

## 2020-07-15 DIAGNOSIS — M25.561 PAIN IN BOTH KNEES, UNSPECIFIED CHRONICITY: ICD-10-CM

## 2020-07-15 DIAGNOSIS — M25.562 PAIN IN BOTH KNEES, UNSPECIFIED CHRONICITY: ICD-10-CM

## 2020-07-15 DIAGNOSIS — M11.261 CHONDROCALCINOSIS OF RIGHT KNEE: ICD-10-CM

## 2020-07-15 DIAGNOSIS — G89.29 CHRONIC PAIN OF RIGHT KNEE: Primary | ICD-10-CM

## 2020-07-15 DIAGNOSIS — M25.561 CHRONIC PAIN OF RIGHT KNEE: Primary | ICD-10-CM

## 2020-07-15 PROCEDURE — 3078F PR MOST RECENT DIASTOLIC BLOOD PRESSURE < 80 MM HG: ICD-10-PCS | Mod: CPTII,S$GLB,, | Performed by: PHYSICIAN ASSISTANT

## 2020-07-15 PROCEDURE — 99999 PR PBB SHADOW E&M-EST. PATIENT-LVL V: ICD-10-PCS | Mod: PBBFAC,,, | Performed by: PHYSICIAN ASSISTANT

## 2020-07-15 PROCEDURE — 99999 PR PBB SHADOW E&M-EST. PATIENT-LVL V: CPT | Mod: PBBFAC,,, | Performed by: PHYSICIAN ASSISTANT

## 2020-07-15 PROCEDURE — 73564 X-RAY EXAM KNEE 4 OR MORE: CPT | Mod: TC,50

## 2020-07-15 PROCEDURE — 3074F PR MOST RECENT SYSTOLIC BLOOD PRESSURE < 130 MM HG: ICD-10-PCS | Mod: CPTII,S$GLB,, | Performed by: PHYSICIAN ASSISTANT

## 2020-07-15 PROCEDURE — 73564 X-RAY EXAM KNEE 4 OR MORE: CPT | Mod: 26,50,, | Performed by: RADIOLOGY

## 2020-07-15 PROCEDURE — 1159F PR MEDICATION LIST DOCUMENTED IN MEDICAL RECORD: ICD-10-PCS | Mod: S$GLB,,, | Performed by: PHYSICIAN ASSISTANT

## 2020-07-15 PROCEDURE — 3008F BODY MASS INDEX DOCD: CPT | Mod: CPTII,S$GLB,, | Performed by: PHYSICIAN ASSISTANT

## 2020-07-15 PROCEDURE — 73564 XR KNEE ORTHO BILAT WITH FLEXION: ICD-10-PCS | Mod: 26,50,, | Performed by: RADIOLOGY

## 2020-07-15 PROCEDURE — 1125F AMNT PAIN NOTED PAIN PRSNT: CPT | Mod: S$GLB,,, | Performed by: PHYSICIAN ASSISTANT

## 2020-07-15 PROCEDURE — 3008F PR BODY MASS INDEX (BMI) DOCUMENTED: ICD-10-PCS | Mod: CPTII,S$GLB,, | Performed by: PHYSICIAN ASSISTANT

## 2020-07-15 PROCEDURE — 99214 PR OFFICE/OUTPT VISIT, EST, LEVL IV, 30-39 MIN: ICD-10-PCS | Mod: S$GLB,,, | Performed by: PHYSICIAN ASSISTANT

## 2020-07-15 PROCEDURE — 3078F DIAST BP <80 MM HG: CPT | Mod: CPTII,S$GLB,, | Performed by: PHYSICIAN ASSISTANT

## 2020-07-15 PROCEDURE — 1125F PR PAIN SEVERITY QUANTIFIED, PAIN PRESENT: ICD-10-PCS | Mod: S$GLB,,, | Performed by: PHYSICIAN ASSISTANT

## 2020-07-15 PROCEDURE — 3074F SYST BP LT 130 MM HG: CPT | Mod: CPTII,S$GLB,, | Performed by: PHYSICIAN ASSISTANT

## 2020-07-15 PROCEDURE — 1159F MED LIST DOCD IN RCRD: CPT | Mod: S$GLB,,, | Performed by: PHYSICIAN ASSISTANT

## 2020-07-15 PROCEDURE — 99214 OFFICE O/P EST MOD 30 MIN: CPT | Mod: S$GLB,,, | Performed by: PHYSICIAN ASSISTANT

## 2020-07-15 NOTE — PROGRESS NOTES
Subjective:      Patient ID: Lupillo Caldera Sr. is a 69 y.o. male.    Chief Complaint: Knee Pain (Bilateral knee pain but worse on the right knee)    Review of patient's allergies indicates:  No Known Allergies   70 yo M presents to clinic with c/o right knee pain x 10 years.  Works as napier.  He had several surgeries on right knee in the past.  No known injury or trauma recently.  Pain is sharp/achy and located to medial and lateral knee.  Worse with walking and squatting.  Also worse at night, has trouble sleeping.  Occasional swelling.  Had steroid injections in the past with some relief for a few months.  He takes Aleve as needed with little improvement.  Has been affecting his work.          Review of Systems   Constitution: Negative for chills, diaphoresis and fever.   HENT: Negative for congestion, ear discharge and ear pain.    Eyes: Negative for blurred vision, discharge, double vision and pain.   Cardiovascular: Negative for chest pain, claudication and cyanosis.   Respiratory: Negative for cough, hemoptysis and shortness of breath.    Endocrine: Negative for cold intolerance and heat intolerance.   Skin: Negative for color change, dry skin, itching and rash.   Musculoskeletal: Positive for joint pain and joint swelling. Negative for arthritis, back pain, falls, gout, muscle weakness and neck pain.   Gastrointestinal: Negative for abdominal pain and change in bowel habit.   Neurological: Negative for brief paralysis, disturbances in coordination and dizziness.   Psychiatric/Behavioral: Negative for altered mental status and depression.         Objective:          General    Constitutional: He is oriented to person, place, and time. He appears well-developed and well-nourished. No distress.   HENT:   Head: Atraumatic.   Eyes: EOM are normal. Right eye exhibits no discharge. Left eye exhibits no discharge.   Cardiovascular: Normal rate.    Pulmonary/Chest: Effort normal. No respiratory distress.    Abdominal: Soft.   Neurological: He is alert and oriented to person, place, and time.   Psychiatric: He has a normal mood and affect. His behavior is normal.     General Musculoskeletal Exam   Gait: antalgic       Right Knee Exam     Inspection   Erythema: absent  Scars: present  Swelling: absent  Effusion: absent  Deformity: absent  Bruising: absent    Tenderness   The patient is tender to palpation of the medial joint line, lateral joint line and patella.    Crepitus   The patient has crepitus of the patella.    Range of Motion   Extension: 10   Flexion: 120     Tests   Ligament Examination   MCL - Valgus: normal (0 to 2mm)  LCL - Varus: normal    Other   Sensation: normal    Left Knee Exam     Inspection   Erythema: absent  Scars: absent  Swelling: absent  Effusion: absent  Deformity: absent  Bruising: absent    Range of Motion   Extension: 0   Flexion: 130     Tests   Stability   MCL - Valgus: normal (0 to 2mm)  LCL - Varus: normal (0 to 2mm)    Other   Sensation: normal    Muscle Strength   Right Lower Extremity   Hip Abduction: 5/5   Quadriceps:  5/5   Hamstrin/5   Left Lower Extremity   Hip Abduction: 5/5   Quadriceps:  5/5   Hamstrin/5     Vascular Exam     Right Pulses  Dorsalis Pedis:      2+          Left Pulses  Dorsalis Pedis:      2+          Edema  Right Lower Leg: absent  Left Lower Leg: absent              Assessment:         Xray Bilateral Knees 7/15/20:  There are advanced degenerative changes of the knees greater on the right than the left.  Bony spurring and tricompartment joint space narrowing on the right and medial joint space narrowing on the left.  There is advanced patellofemoral joint degenerative changes on the right with probable loose bodies.  There is bilateral chondrocalcinosis.  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  There is no evidence of fracture, dislocation or other acute osseous abnormality.    Encounter Diagnoses   Name Primary?    Chronic pain of  right knee Yes    Post-traumatic osteoarthritis of right knee     Chondrocalcinosis of right knee     Chronic pain of right knee  -     Ambulatory referral/consult to Orthopedics  -     Ambulatory referral/consult to Orthopedics; Future; Expected date: 07/22/2020    Post-traumatic osteoarthritis of right knee    Chondrocalcinosis of right knee               Plan:         I made the decision to obtain old records of the patient including previous notes and imaging. New imaging was ordered today of the extremity or extremities evaluated. I independently reviewed and interpreted the radiographs and/or MRIs today as well as prior imaging.    The total face-to-face encounter time with this patient was 30 minutes and greater than 50% of of the encounter time was spent counseling the patient, coordinating care, and education regarding the pathology and natural history of his diagnosis. We have discussed a variety of treatment options including medications, injections, physical therapy and other alternative treatments. Pt is requesting referral to surgeon to discuss knee replacement.    1. Continue Aleve as directed as needed.  2. Referral to Dr. Tejada in [place]  3. Ice compress to the affected area 2-3x a day for 15-20 minutes as needed for pain management.  4. RTC as needed.      Patient voices understanding of and agreement with treatment plan. All of the patient's questions were answered and the patient will contact us if he has any questions or concerns in the interim.

## 2020-07-15 NOTE — LETTER
July 15, 2020      Abram Crum MD  [address]  [facility] [address]           [place] [facility]  [address] [facility] [address]  Phone: [phone]          Patient: Lupillo Caldera Sr.   MR Number: [id number]   YOB: 1950   Date of Visit: 7/15/2020       Dear Dr. Abram Crum:    Thank you for referring Lupillo Caldera to me for evaluation. Attached you will find relevant portions of my assessment and plan of care.    If you have questions, please do not hesitate to call me. I look forward to following Lupillo Caldera along with you.    Sincerely,    Lakshmi Powell PA-C    Enclosure  CC:  No Recipients    If you would like to receive this communication electronically, please contact [email or web address] or [phone] to request more information on [employer] access.    For providers and/or their staff who would like to refer a patient to [facility], please contact us through our one-stop-shop provider referral line, [facility], at [phone].    If you feel you have received this communication in error or would no longer like to receive these types of communications, please e-mail [email or web address]

## 2020-07-16 ENCOUNTER — TELEPHONE (OUTPATIENT)
Dept: ORTHOPEDICS | Facility: CLINIC | Age: 70
End: 2020-07-16

## 2020-07-16 NOTE — TELEPHONE ENCOUNTER
----- Message from Daria Cook sent at 7/15/2020  1:23 PM CDT -----  Regarding: returning missed call  Contact: Ms Caldera [phone]  Type:  Patient Returning Call    Who Called:patient returning missed call  Who Left Message for Patient:  nurse  Does the patient know what this is regarding?:  Would the patient rather a call back or a response via [facility]? call  Best Call Back Number:[phone] or [phone]  Additional Information:

## 2020-10-06 ENCOUNTER — OFFICE VISIT (OUTPATIENT)
Dept: NEUROLOGY | Facility: CLINIC | Age: 70
End: 2020-10-06
Payer: MEDICARE

## 2020-10-06 VITALS
HEIGHT: 71 IN | SYSTOLIC BLOOD PRESSURE: 142 MMHG | BODY MASS INDEX: 34.19 KG/M2 | WEIGHT: 244.25 LBS | DIASTOLIC BLOOD PRESSURE: 78 MMHG | TEMPERATURE: 98 F | RESPIRATION RATE: 14 BRPM | HEART RATE: 72 BPM

## 2020-10-06 DIAGNOSIS — M21.371 RIGHT FOOT DROP: ICD-10-CM

## 2020-10-06 DIAGNOSIS — E78.5 HYPERLIPIDEMIA, UNSPECIFIED HYPERLIPIDEMIA TYPE: ICD-10-CM

## 2020-10-06 DIAGNOSIS — G25.81 RESTLESS LEG SYNDROME: ICD-10-CM

## 2020-10-06 DIAGNOSIS — G62.9 NEUROPATHY: ICD-10-CM

## 2020-10-06 DIAGNOSIS — M54.16 LUMBAR RADICULOPATHY: Primary | ICD-10-CM

## 2020-10-06 DIAGNOSIS — M25.561 CHRONIC PAIN OF RIGHT KNEE: ICD-10-CM

## 2020-10-06 DIAGNOSIS — G89.29 CHRONIC PAIN OF RIGHT KNEE: ICD-10-CM

## 2020-10-06 DIAGNOSIS — I10 HYPERTENSION, UNSPECIFIED TYPE: ICD-10-CM

## 2020-10-06 PROCEDURE — 1159F PR MEDICATION LIST DOCUMENTED IN MEDICAL RECORD: ICD-10-PCS | Mod: S$GLB,,, | Performed by: NURSE PRACTITIONER

## 2020-10-06 PROCEDURE — 1159F MED LIST DOCD IN RCRD: CPT | Mod: S$GLB,,, | Performed by: NURSE PRACTITIONER

## 2020-10-06 PROCEDURE — 1101F PR PT FALLS ASSESS DOC 0-1 FALLS W/OUT INJ PAST YR: ICD-10-PCS | Mod: CPTII,S$GLB,, | Performed by: NURSE PRACTITIONER

## 2020-10-06 PROCEDURE — 99999 PR PBB SHADOW E&M-EST. PATIENT-LVL IV: CPT | Mod: PBBFAC,,, | Performed by: NURSE PRACTITIONER

## 2020-10-06 PROCEDURE — 99214 PR OFFICE/OUTPT VISIT, EST, LEVL IV, 30-39 MIN: ICD-10-PCS | Mod: S$GLB,,, | Performed by: NURSE PRACTITIONER

## 2020-10-06 PROCEDURE — 1101F PT FALLS ASSESS-DOCD LE1/YR: CPT | Mod: CPTII,S$GLB,, | Performed by: NURSE PRACTITIONER

## 2020-10-06 PROCEDURE — 3078F PR MOST RECENT DIASTOLIC BLOOD PRESSURE < 80 MM HG: ICD-10-PCS | Mod: CPTII,S$GLB,, | Performed by: NURSE PRACTITIONER

## 2020-10-06 PROCEDURE — 1125F PR PAIN SEVERITY QUANTIFIED, PAIN PRESENT: ICD-10-PCS | Mod: S$GLB,,, | Performed by: NURSE PRACTITIONER

## 2020-10-06 PROCEDURE — 3077F PR MOST RECENT SYSTOLIC BLOOD PRESSURE >= 140 MM HG: ICD-10-PCS | Mod: CPTII,S$GLB,, | Performed by: NURSE PRACTITIONER

## 2020-10-06 PROCEDURE — 99999 PR PBB SHADOW E&M-EST. PATIENT-LVL IV: ICD-10-PCS | Mod: PBBFAC,,, | Performed by: NURSE PRACTITIONER

## 2020-10-06 PROCEDURE — 1125F AMNT PAIN NOTED PAIN PRSNT: CPT | Mod: S$GLB,,, | Performed by: NURSE PRACTITIONER

## 2020-10-06 PROCEDURE — 99214 OFFICE O/P EST MOD 30 MIN: CPT | Mod: S$GLB,,, | Performed by: NURSE PRACTITIONER

## 2020-10-06 PROCEDURE — 3008F PR BODY MASS INDEX (BMI) DOCUMENTED: ICD-10-PCS | Mod: CPTII,S$GLB,, | Performed by: NURSE PRACTITIONER

## 2020-10-06 PROCEDURE — 3078F DIAST BP <80 MM HG: CPT | Mod: CPTII,S$GLB,, | Performed by: NURSE PRACTITIONER

## 2020-10-06 PROCEDURE — 3008F BODY MASS INDEX DOCD: CPT | Mod: CPTII,S$GLB,, | Performed by: NURSE PRACTITIONER

## 2020-10-06 PROCEDURE — 3077F SYST BP >= 140 MM HG: CPT | Mod: CPTII,S$GLB,, | Performed by: NURSE PRACTITIONER

## 2020-10-06 NOTE — PROGRESS NOTES
"HPI :  Lupillo Caldera Sr. is a 69 y.o. male who is s/p 2 lumbar spinal surgeries now with Residual foot drop from right L5 radiduluopathy with ongoing rennervation by  EMG and prominent cramps/spasms in the legs--failed lower back syndrome. Some component of RLS.  2016 MRI L spine showed spondylosis and NF narrowing. 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 He has HTN and HLD.     He presents today for a follow up visit. He continues with right knee pain. He was referred to Ortho PA at [place] for this. He was referred to another Orthopedist for a greater level of care, but did not proceed with this. He prefers to see a local provider.     He kneeled down on his right knee recently, and this worsened his pain. Pain is severe at times. He feels as if his knee "has a fever" at times. He has a history of knee surgery in 1980s, Dr Hudson Muñoz.He has not injured the knee recently. Pain is in the anterior and posterior knee joint. Knows of chronic degenerative changes at the right knee joint. X-rays done with [place] [facility] recently.     Pain in the right foot and ankle burning pain is active only a little at night. This is tolerable.     No radicular pain and back pain is well tolerated.    AFO used well.    RLS is well controlled.     CTS right is not active. Thumb pain is responsive to Voltaren.    Blood sugar has been controlled per patient.     Review of Systems   Constitutional: Negative for fever.   Eyes: Negative for double vision.   Respiratory: Negative for hemoptysis.    Cardiovascular: Negative for leg swelling.   Gastrointestinal: Negative for blood in stool.   Genitourinary: Negative for hematuria.   Musculoskeletal: Positive for joint pain. Negative for falls.   Skin: Negative for rash.   Neurological: Negative for dizziness and headaches.   Psychiatric/Behavioral: Negative " for memory loss.     Exam:   Gen Appearance, well developed/nourished in no apparent distress  CV: 2+ distal pulses with no edema or swelling  Neuro:  MS: Awake, alert, Sustains attention. Recent/remote memory intact, Language is full to spontaneous speech/comprehension. Fund of Knowledge is full  CN: Optic discs are flat with normal vasculature, PERRL, Extraoccular movements and visual fields are full. Normal facial sensation and strength, Tongue and Palate are midline and strong. Shoulder Shrug symmetric and strong.  Motor: Normal bulk, tone decreaed in the right lower leg muscles, no abnormal movements. 5/5 strength bilateral upper/lower extremities with 2+ reflexes except 3/5 right foot and toe extensors (improved with gravity only to 4+/5) (in afo today) only and absent right ankle jerks and no clonus  Sensation:  Intact to temp and and vibration  Gait: Normal stance, no ataxia with afo in place-- there is no compression on the medial ankle with this brace.     Imagin2020 Bilateral Knee X-rays:   FINDINGS:  There are advanced degenerative changes of the knees greater on the right than the left.  Bony spurring and tricompartment joint space narrowing on the right and medial joint space narrowing on the left.  There is advanced patellofemoral joint degenerative changes on the right with probable loose bodies.  There is bilateral chondrocalcinosis.  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     There is no evidence of fracture, dislocation or other acute osseous abnormality.     MRI L-spine 2016: Status post L4-L5 fusion.  Lumbar spondylosis as above with moderate right neuroforaminal narrowing at L4-5.    EMG 2016: 1. Mild Right Carpal Tunnel Syndrome  2. Chronic Lumbar Radiculopathy at L5 on the right  3. 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      Labs:   2016 CMP, CBC, TSH, B12, SPEP/FRITZ: Unremarkable except mild  elevation in fasting glucose (108)  12/2016 fasting glucose improved with normal A1C    Assessment/Recommendation: Lupillo Caldera Sr. is a 69 y.o. male who is s/p 2 lumbar spinal surgeries now with Residual foot drop from right L5 radiduluopathy with ongoing rennervation by  EMG and prominent cramps/spasms in the legs--failed lower back syndrome. Some component of RLS.  2016 MRI L spine showed spondylosis and NF narrowing. 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      I recommend:  1. Voltaren gel for right thumb pain, which I suspect to be OA related, is helpful. Continue this PRN. This is not consistent with right CTS.   2. His right lumbar complaints resolved with PT per [facility] prior.   3. He continues compliance with right AFO for right foot drop.  4. Continue Cymbalta for radicular complaints and neuropathic complaints.   -Could raise dose of Cymbalta if desired/ declined prior  - Patient reports poor tolerance of gabapentin prior.  5. Continue Baclofen for RLS complaints. Switching statin (with another provider) also helped prior.   6. He reports failing left CTR prior. No specific treatment needed for right CTS at this time   7. Fasting blood sugar abnormality note prior had improved. Advised yearly lab follow up with PCP or cardiologist  8. Refer to Orthopedics/Dr. Herrera for chronic worsening right knee pain. He prefers to see a local provider.     RTC as scheduled for 1 year follow up in 7/2021.

## 2021-04-12 ENCOUNTER — TELEPHONE (OUTPATIENT)
Dept: NEUROLOGY | Facility: CLINIC | Age: 71
End: 2021-04-12

## 2021-04-12 DIAGNOSIS — M54.16 LUMBAR RADICULOPATHY: ICD-10-CM

## 2021-04-12 DIAGNOSIS — G62.9 NEUROPATHY: ICD-10-CM

## 2021-04-13 RX ORDER — DULOXETIN HYDROCHLORIDE 60 MG/1
60 CAPSULE, DELAYED RELEASE ORAL DAILY
Qty: 30 CAPSULE | Refills: 3 | Status: SHIPPED | OUTPATIENT
Start: 2021-04-13 | End: 2021-04-20 | Stop reason: SDUPTHER

## 2021-04-19 ENCOUNTER — TELEPHONE (OUTPATIENT)
Dept: NEUROLOGY | Facility: CLINIC | Age: 71
End: 2021-04-19

## 2021-04-19 DIAGNOSIS — M54.16 LUMBAR RADICULOPATHY: ICD-10-CM

## 2021-04-19 DIAGNOSIS — G62.9 NEUROPATHY: ICD-10-CM

## 2021-04-20 RX ORDER — DULOXETIN HYDROCHLORIDE 30 MG/1
30 CAPSULE, DELAYED RELEASE ORAL DAILY
Qty: 90 CAPSULE | Refills: 1 | Status: SHIPPED | OUTPATIENT
Start: 2021-04-20 | End: 2021-05-26 | Stop reason: SDUPTHER

## 2021-05-17 ENCOUNTER — TELEPHONE (OUTPATIENT)
Dept: NEUROLOGY | Facility: CLINIC | Age: 71
End: 2021-05-17

## 2021-05-17 DIAGNOSIS — M54.16 LUMBAR RADICULOPATHY: ICD-10-CM

## 2021-05-17 DIAGNOSIS — G62.9 NEUROPATHY: ICD-10-CM

## 2021-05-26 RX ORDER — DULOXETIN HYDROCHLORIDE 30 MG/1
30 CAPSULE, DELAYED RELEASE ORAL DAILY
Qty: 90 CAPSULE | Refills: 1 | Status: SHIPPED | OUTPATIENT
Start: 2021-05-26 | End: 2021-07-13 | Stop reason: SDUPTHER

## 2021-07-13 ENCOUNTER — OFFICE VISIT (OUTPATIENT)
Dept: NEUROLOGY | Facility: CLINIC | Age: 71
End: 2021-07-13
Payer: MEDICARE

## 2021-07-13 VITALS
BODY MASS INDEX: 33.04 KG/M2 | WEIGHT: 236.88 LBS | HEART RATE: 74 BPM | SYSTOLIC BLOOD PRESSURE: 112 MMHG | DIASTOLIC BLOOD PRESSURE: 72 MMHG

## 2021-07-13 DIAGNOSIS — Z96.651 HISTORY OF TOTAL RIGHT KNEE REPLACEMENT: ICD-10-CM

## 2021-07-13 DIAGNOSIS — R20.0 RIGHT LEG NUMBNESS: ICD-10-CM

## 2021-07-13 DIAGNOSIS — G25.81 RESTLESS LEG SYNDROME: ICD-10-CM

## 2021-07-13 DIAGNOSIS — M79.604 RIGHT LEG PAIN: ICD-10-CM

## 2021-07-13 DIAGNOSIS — M21.371 RIGHT FOOT DROP: ICD-10-CM

## 2021-07-13 DIAGNOSIS — M54.16 LUMBAR RADICULOPATHY: ICD-10-CM

## 2021-07-13 DIAGNOSIS — G62.9 NEUROPATHY: Primary | ICD-10-CM

## 2021-07-13 PROCEDURE — 1159F MED LIST DOCD IN RCRD: CPT | Mod: S$GLB,,, | Performed by: NURSE PRACTITIONER

## 2021-07-13 PROCEDURE — 1101F PT FALLS ASSESS-DOCD LE1/YR: CPT | Mod: CPTII,S$GLB,, | Performed by: NURSE PRACTITIONER

## 2021-07-13 PROCEDURE — 1159F PR MEDICATION LIST DOCUMENTED IN MEDICAL RECORD: ICD-10-PCS | Mod: S$GLB,,, | Performed by: NURSE PRACTITIONER

## 2021-07-13 PROCEDURE — 3288F PR FALLS RISK ASSESSMENT DOCUMENTED: ICD-10-PCS | Mod: CPTII,S$GLB,, | Performed by: NURSE PRACTITIONER

## 2021-07-13 PROCEDURE — 99999 PR PBB SHADOW E&M-EST. PATIENT-LVL III: CPT | Mod: PBBFAC,,, | Performed by: NURSE PRACTITIONER

## 2021-07-13 PROCEDURE — 3008F PR BODY MASS INDEX (BMI) DOCUMENTED: ICD-10-PCS | Mod: CPTII,S$GLB,, | Performed by: NURSE PRACTITIONER

## 2021-07-13 PROCEDURE — 3008F BODY MASS INDEX DOCD: CPT | Mod: CPTII,S$GLB,, | Performed by: NURSE PRACTITIONER

## 2021-07-13 PROCEDURE — 3288F FALL RISK ASSESSMENT DOCD: CPT | Mod: CPTII,S$GLB,, | Performed by: NURSE PRACTITIONER

## 2021-07-13 PROCEDURE — 99214 OFFICE O/P EST MOD 30 MIN: CPT | Mod: S$GLB,,, | Performed by: NURSE PRACTITIONER

## 2021-07-13 PROCEDURE — 99999 PR PBB SHADOW E&M-EST. PATIENT-LVL III: ICD-10-PCS | Mod: PBBFAC,,, | Performed by: NURSE PRACTITIONER

## 2021-07-13 PROCEDURE — 99214 PR OFFICE/OUTPT VISIT, EST, LEVL IV, 30-39 MIN: ICD-10-PCS | Mod: S$GLB,,, | Performed by: NURSE PRACTITIONER

## 2021-07-13 PROCEDURE — 1101F PR PT FALLS ASSESS DOC 0-1 FALLS W/OUT INJ PAST YR: ICD-10-PCS | Mod: CPTII,S$GLB,, | Performed by: NURSE PRACTITIONER

## 2021-07-13 RX ORDER — GABAPENTIN 600 MG/1
600 TABLET ORAL 2 TIMES DAILY
Qty: 180 TABLET | Refills: 3 | Status: SHIPPED | OUTPATIENT
Start: 2021-07-13 | End: 2022-08-10

## 2021-07-13 RX ORDER — DULOXETIN HYDROCHLORIDE 30 MG/1
30 CAPSULE, DELAYED RELEASE ORAL DAILY
Qty: 90 CAPSULE | Refills: 3 | Status: SHIPPED | OUTPATIENT
Start: 2021-07-13 | End: 2022-08-18 | Stop reason: SDUPTHER

## 2021-07-13 RX ORDER — BACLOFEN 10 MG/1
10 TABLET ORAL NIGHTLY
Qty: 90 TABLET | Refills: 3 | Status: SHIPPED | OUTPATIENT
Start: 2021-07-13 | End: 2022-08-18 | Stop reason: SDUPTHER

## 2021-08-03 ENCOUNTER — TELEPHONE (OUTPATIENT)
Dept: NEUROLOGY | Facility: CLINIC | Age: 71
End: 2021-08-03

## 2022-07-21 ENCOUNTER — HOSPITAL ENCOUNTER (OUTPATIENT)
Dept: SLEEP MEDICINE | Facility: HOSPITAL | Age: 72
Discharge: HOME | End: 2022-07-21
Attending: PHYSICIAN ASSISTANT
Payer: MEDICARE

## 2022-07-21 DIAGNOSIS — G47.9 REPETITIVE INTRUSIONS OF SLEEP: ICD-10-CM

## 2022-07-21 PROCEDURE — 95806 SLEEP STUDY UNATT&RESP EFFT: CPT

## 2022-08-09 ENCOUNTER — TELEPHONE (OUTPATIENT)
Dept: NEUROLOGY | Facility: CLINIC | Age: 72
End: 2022-08-09
Payer: MEDICARE

## 2022-08-09 NOTE — TELEPHONE ENCOUNTER
----- Message from Jimena Aranda sent at 2022  4:37 PM CDT -----  Contact: self  Lupillo Caldera Sr.  MRN: [id number]  : 1950  PCP: Clint De  Home Phone      [phone]  Work Phone      Not on file.  Mobile          [phone]      MESSAGE: needs refill on NEURONTIN    Pharmacy [facility] [place]    Phone [phone]

## 2022-08-10 DIAGNOSIS — G62.9 NEUROPATHY: Primary | ICD-10-CM

## 2022-08-10 RX ORDER — GABAPENTIN 600 MG/1
600 TABLET ORAL 2 TIMES DAILY
Qty: 180 TABLET | Refills: 3 | OUTPATIENT
Start: 2022-08-10 | End: 2023-08-10

## 2022-08-10 NOTE — TELEPHONE ENCOUNTER
----- Message from Sudha Ramirez sent at 8/10/2022 10:59 AM CDT -----  Contact: PATIENT  Lupillo Caldera Sr.  MRN: [id number]  : 1950  PCP: Clint De  Home Phone      [phone]  Work Phone      Not on file.  Mobile          [phone]      MESSAGE: Patient needs a refill on Neurontin 600 mg, 1 BID sent to [facility].  He has an appointment scheduled for 22 with Rosy.        Phone: [phone]

## 2022-08-18 ENCOUNTER — OFFICE VISIT (OUTPATIENT)
Dept: NEUROLOGY | Facility: CLINIC | Age: 72
End: 2022-08-18
Payer: MEDICARE

## 2022-08-18 VITALS
BODY MASS INDEX: 35.35 KG/M2 | WEIGHT: 246.94 LBS | HEIGHT: 70 IN | SYSTOLIC BLOOD PRESSURE: 130 MMHG | RESPIRATION RATE: 16 BRPM | HEART RATE: 69 BPM | DIASTOLIC BLOOD PRESSURE: 70 MMHG

## 2022-08-18 DIAGNOSIS — R20.0 RIGHT LEG NUMBNESS: ICD-10-CM

## 2022-08-18 DIAGNOSIS — M25.571 CHRONIC PAIN OF RIGHT ANKLE: ICD-10-CM

## 2022-08-18 DIAGNOSIS — M54.16 LUMBAR RADICULOPATHY: ICD-10-CM

## 2022-08-18 DIAGNOSIS — G89.29 CHRONIC PAIN OF RIGHT ANKLE: ICD-10-CM

## 2022-08-18 DIAGNOSIS — M21.371 RIGHT FOOT DROP: ICD-10-CM

## 2022-08-18 DIAGNOSIS — M79.604 RIGHT LEG PAIN: ICD-10-CM

## 2022-08-18 DIAGNOSIS — Z96.651 HISTORY OF TOTAL RIGHT KNEE REPLACEMENT: ICD-10-CM

## 2022-08-18 DIAGNOSIS — N41.1 PROSTATITIS, CHRONIC: ICD-10-CM

## 2022-08-18 DIAGNOSIS — G25.81 RESTLESS LEG SYNDROME: ICD-10-CM

## 2022-08-18 DIAGNOSIS — G62.9 NEUROPATHY: Primary | ICD-10-CM

## 2022-08-18 PROCEDURE — 3075F SYST BP GE 130 - 139MM HG: CPT | Mod: CPTII,S$GLB,, | Performed by: NURSE PRACTITIONER

## 2022-08-18 PROCEDURE — 1160F RVW MEDS BY RX/DR IN RCRD: CPT | Mod: CPTII,S$GLB,, | Performed by: NURSE PRACTITIONER

## 2022-08-18 PROCEDURE — 1159F MED LIST DOCD IN RCRD: CPT | Mod: CPTII,S$GLB,, | Performed by: NURSE PRACTITIONER

## 2022-08-18 PROCEDURE — 1159F PR MEDICATION LIST DOCUMENTED IN MEDICAL RECORD: ICD-10-PCS | Mod: CPTII,S$GLB,, | Performed by: NURSE PRACTITIONER

## 2022-08-18 PROCEDURE — 99214 PR OFFICE/OUTPT VISIT, EST, LEVL IV, 30-39 MIN: ICD-10-PCS | Mod: S$GLB,,, | Performed by: NURSE PRACTITIONER

## 2022-08-18 PROCEDURE — 3078F DIAST BP <80 MM HG: CPT | Mod: CPTII,S$GLB,, | Performed by: NURSE PRACTITIONER

## 2022-08-18 PROCEDURE — 4010F ACE/ARB THERAPY RXD/TAKEN: CPT | Mod: CPTII,S$GLB,, | Performed by: NURSE PRACTITIONER

## 2022-08-18 PROCEDURE — 1160F PR REVIEW ALL MEDS BY PRESCRIBER/CLIN PHARMACIST DOCUMENTED: ICD-10-PCS | Mod: CPTII,S$GLB,, | Performed by: NURSE PRACTITIONER

## 2022-08-18 PROCEDURE — 3075F PR MOST RECENT SYSTOLIC BLOOD PRESS GE 130-139MM HG: ICD-10-PCS | Mod: CPTII,S$GLB,, | Performed by: NURSE PRACTITIONER

## 2022-08-18 PROCEDURE — 99999 PR PBB SHADOW E&M-EST. PATIENT-LVL IV: CPT | Mod: PBBFAC,,, | Performed by: NURSE PRACTITIONER

## 2022-08-18 PROCEDURE — 99214 OFFICE O/P EST MOD 30 MIN: CPT | Mod: S$GLB,,, | Performed by: NURSE PRACTITIONER

## 2022-08-18 PROCEDURE — 1126F PR PAIN SEVERITY QUANTIFIED, NO PAIN PRESENT: ICD-10-PCS | Mod: CPTII,S$GLB,, | Performed by: NURSE PRACTITIONER

## 2022-08-18 PROCEDURE — 3008F BODY MASS INDEX DOCD: CPT | Mod: CPTII,S$GLB,, | Performed by: NURSE PRACTITIONER

## 2022-08-18 PROCEDURE — 3008F PR BODY MASS INDEX (BMI) DOCUMENTED: ICD-10-PCS | Mod: CPTII,S$GLB,, | Performed by: NURSE PRACTITIONER

## 2022-08-18 PROCEDURE — 4010F PR ACE/ARB THEARPY RXD/TAKEN: ICD-10-PCS | Mod: CPTII,S$GLB,, | Performed by: NURSE PRACTITIONER

## 2022-08-18 PROCEDURE — 1126F AMNT PAIN NOTED NONE PRSNT: CPT | Mod: CPTII,S$GLB,, | Performed by: NURSE PRACTITIONER

## 2022-08-18 PROCEDURE — 99999 PR PBB SHADOW E&M-EST. PATIENT-LVL IV: ICD-10-PCS | Mod: PBBFAC,,, | Performed by: NURSE PRACTITIONER

## 2022-08-18 PROCEDURE — 3078F PR MOST RECENT DIASTOLIC BLOOD PRESSURE < 80 MM HG: ICD-10-PCS | Mod: CPTII,S$GLB,, | Performed by: NURSE PRACTITIONER

## 2022-08-18 RX ORDER — ZINC SULFATE 50(220)MG
220 CAPSULE ORAL DAILY
COMMUNITY

## 2022-08-18 RX ORDER — AMLODIPINE BESYLATE 2.5 MG/1
2.5 TABLET ORAL DAILY
COMMUNITY
Start: 2022-03-18 | End: 2023-11-15

## 2022-08-18 RX ORDER — AMLODIPINE BESYLATE 5 MG/1
5 TABLET ORAL DAILY
COMMUNITY
Start: 2022-06-18 | End: 2023-11-15

## 2022-08-18 RX ORDER — BACLOFEN 10 MG/1
10 TABLET ORAL NIGHTLY
Qty: 90 TABLET | Refills: 3 | Status: SHIPPED | OUTPATIENT
Start: 2022-08-18 | End: 2023-09-26

## 2022-08-18 RX ORDER — GABAPENTIN 600 MG/1
600 TABLET ORAL 2 TIMES DAILY
Qty: 180 TABLET | Refills: 3 | Status: SHIPPED | OUTPATIENT
Start: 2022-08-18 | End: 2023-08-22

## 2022-08-18 RX ORDER — DIAZEPAM 5 MG/1
TABLET ORAL
COMMUNITY
Start: 2022-03-11

## 2022-08-18 RX ORDER — LISINOPRIL AND HYDROCHLOROTHIAZIDE 12.5; 2 MG/1; MG/1
TABLET ORAL
COMMUNITY
Start: 2022-05-23

## 2022-08-18 RX ORDER — PYRIDOXINE HCL (VITAMIN B6) 100 MG
100 TABLET ORAL DAILY
COMMUNITY

## 2022-08-18 RX ORDER — MONTELUKAST SODIUM 10 MG/1
TABLET ORAL
COMMUNITY
End: 2023-11-15

## 2022-08-18 RX ORDER — DULOXETIN HYDROCHLORIDE 30 MG/1
30 CAPSULE, DELAYED RELEASE ORAL DAILY
Qty: 90 CAPSULE | Refills: 3 | Status: SHIPPED | OUTPATIENT
Start: 2022-08-18 | End: 2023-08-29

## 2022-08-18 NOTE — PROGRESS NOTES
HPI :  Lupillo Caldera Sr. is a 71 y.o. male who is s/p 2 lumbar spinal surgeries now with Residual foot drop from right L5 radiduluopathy with ongoing rennervation by  EMG and prominent cramps/spasms in the legs--failed lower back syndrome. Some component of RLS.  2016 MRI L spine showed spondylosis and NF narrowing. 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 He has HTN and HLD.     He presents today for a follow up visit. He was admitted last edin for dehydration/heat stroke/UTI/prostate infection. He had been working outside in the heat.     Lumbar pain improved after prostate infection resolved. He saw Dr. Arellano/Urology.     RLE pain helped with Gabapentin.     Cymbalta continues to help neuropathic complaints. Some numbness to the tips of his toes in the evening. No falls.     He does use neuropathic complaint for his neuropathy, which is helpful.     No radicular pain and back pain is well tolerated.    Right AFO used well. He has increased medial right ankle pain at the end of the day. He declines referral to Ortho for this.     RLS is well controlled.     CTS right is not active. Thumb pain is responsive to Voltaren.    He is working again in [employer] since his right TKA.     Review of Systems   Constitutional: Negative for fever.   Eyes: Negative for double vision.   Respiratory: Negative for hemoptysis.    Cardiovascular: Negative for leg swelling.   Gastrointestinal: Negative for blood in stool.   Genitourinary: Negative for hematuria.   Musculoskeletal: Positive for joint pain. Negative for back pain, falls and neck pain.   Skin: Negative for rash.   Neurological: Positive for sensory change and focal weakness. Negative for dizziness and headaches.        Right foot drop   Psychiatric/Behavioral: Negative for memory loss.     Exam:   Gen Appearance, well developed/nourished  in no apparent distress  CV: 2+ distal pulses with no edema or swelling  Neuro:  MS: Awake, alert, Sustains attention. Recent/remote memory intact, Language is full to spontaneous speech/comprehension. Fund of Knowledge is full  CN: Optic discs are flat with normal vasculature, PERRL, Extraoccular movements and visual fields are full. Normal facial sensation and strength, Tongue and Palate are midline and strong. Shoulder Shrug symmetric and strong.  Motor: Normal bulk, tone decreaed in the right lower leg muscles, no abnormal movements. 5/5 strength bilateral upper/lower extremities with 2+ reflexes except 3/5 right foot and toe extensors (improved with gravity only to 4+/5) (in afo today) only and absent right ankle jerks and no clonus  Sensation:  Intact to temp and and vibration, mildly positive Romberg  Gait: Normal stance, no ataxia with afo in place-- there is no compression on the medial ankle with this brace.     Imagin2020 Bilateral Knee X-rays:   FINDINGS:  There are advanced degenerative changes of the knees greater on the right than the left.  Bony spurring and tricompartment joint space narrowing on the right and medial joint space narrowing on the left. There is advanced patellofemoral joint degenerative changes on the right with probable loose bodies.  There is bilateral chondrocalcinosis.  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     There is no evidence of fracture, dislocation or other acute osseous abnormality.     MRI L-spine 2016:   Status post L4-L5 fusion.  Lumbar spondylosis as above with moderate right neuroforaminal narrowing at L4-5.    EMG 2016:   1. Mild Right Carpal Tunnel Syndrome  2. Chronic Lumbar Radiculopathy at L5 on the right  3. 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      Labs:   2016 CMP, CBC, TSH, B12, SPEP/FRITZ: Unremarkable except mild elevation in fasting glucose  (108)  12/2016 fasting glucose improved with normal A1C    Assessment/Recommendation: Lupillo Caldera Sr. is a 71 y.o. male who is s/p 2 lumbar spinal surgeries now with Residual foot drop from right L5 radiduluopathy with ongoing rennervation by  EMG and prominent cramps/spasms in the legs--failed lower back syndrome. Some component of RLS.  2016 MRI L spine showed spondylosis and NF narrowing. 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      I recommend:  1. Right thumb OA complaints resolved. No longer needing Voltaren gel, which was helpful prior. May restart at any point if needed. This was not consistent with right CTS.     2. His right lumbar complaints resolved with PT per [facility] prior.     3. He continues compliance with right AFO for right foot drop.    He has some right ankle pain, which may be orthopedic in nature from positioning/ambulation, given his foot drop. He declines Ortho referral. I advise him to try Rx compound cream for this.     4. Continue Cymbalta for radicular complaints and neuropathic complaints. He couldn't tolerate 60 mg dosing, due to excess dreaming and limb movements.   Continue Gabapentin 600 mg bid for RLE pain, which is very effective. He is now tolerating well.     5. Continue Baclofen for RLS complaints. Switching statin (with another provider) also helped prior.     6. He reports failing left CTR prior. No specific treatment needed for right CTS at this time.     7. Fasting blood sugar abnormality note prior had improved. Advised yearly lab follow up with PCP or Cardiologist.     8. Now s/p right knee replacement with great improvement to right knee pain.     FU 1 year

## 2023-07-25 ENCOUNTER — OFFICE VISIT (OUTPATIENT)
Dept: SURGERY | Facility: CLINIC | Age: 73
End: 2023-07-25
Payer: MEDICARE

## 2023-07-25 VITALS
DIASTOLIC BLOOD PRESSURE: 68 MMHG | WEIGHT: 248.5 LBS | BODY MASS INDEX: 35.58 KG/M2 | RESPIRATION RATE: 18 BRPM | HEIGHT: 70 IN | SYSTOLIC BLOOD PRESSURE: 136 MMHG | HEART RATE: 82 BPM

## 2023-07-25 DIAGNOSIS — D17.1 LIPOMA OF BREAST: ICD-10-CM

## 2023-07-25 PROCEDURE — 1160F PR REVIEW ALL MEDS BY PRESCRIBER/CLIN PHARMACIST DOCUMENTED: ICD-10-PCS | Mod: CPTII,S$GLB,, | Performed by: SURGERY

## 2023-07-25 PROCEDURE — 99999 PR PBB SHADOW E&M-EST. PATIENT-LVL IV: CPT | Mod: PBBFAC,,, | Performed by: SURGERY

## 2023-07-25 PROCEDURE — 1160F RVW MEDS BY RX/DR IN RCRD: CPT | Mod: CPTII,S$GLB,, | Performed by: SURGERY

## 2023-07-25 PROCEDURE — 4010F ACE/ARB THERAPY RXD/TAKEN: CPT | Mod: CPTII,S$GLB,, | Performed by: SURGERY

## 2023-07-25 PROCEDURE — 4010F PR ACE/ARB THEARPY RXD/TAKEN: ICD-10-PCS | Mod: CPTII,S$GLB,, | Performed by: SURGERY

## 2023-07-25 PROCEDURE — 1159F PR MEDICATION LIST DOCUMENTED IN MEDICAL RECORD: ICD-10-PCS | Mod: CPTII,S$GLB,, | Performed by: SURGERY

## 2023-07-25 PROCEDURE — 3075F SYST BP GE 130 - 139MM HG: CPT | Mod: CPTII,S$GLB,, | Performed by: SURGERY

## 2023-07-25 PROCEDURE — 3288F PR FALLS RISK ASSESSMENT DOCUMENTED: ICD-10-PCS | Mod: CPTII,S$GLB,, | Performed by: SURGERY

## 2023-07-25 PROCEDURE — 3078F DIAST BP <80 MM HG: CPT | Mod: CPTII,S$GLB,, | Performed by: SURGERY

## 2023-07-25 PROCEDURE — 1100F PR PT FALLS ASSESS DOC 2+ FALLS/FALL W/INJURY/YR: ICD-10-PCS | Mod: CPTII,S$GLB,, | Performed by: SURGERY

## 2023-07-25 PROCEDURE — 1100F PTFALLS ASSESS-DOCD GE2>/YR: CPT | Mod: CPTII,S$GLB,, | Performed by: SURGERY

## 2023-07-25 PROCEDURE — 3078F PR MOST RECENT DIASTOLIC BLOOD PRESSURE < 80 MM HG: ICD-10-PCS | Mod: CPTII,S$GLB,, | Performed by: SURGERY

## 2023-07-25 PROCEDURE — 1126F PR PAIN SEVERITY QUANTIFIED, NO PAIN PRESENT: ICD-10-PCS | Mod: CPTII,S$GLB,, | Performed by: SURGERY

## 2023-07-25 PROCEDURE — 3008F BODY MASS INDEX DOCD: CPT | Mod: CPTII,S$GLB,, | Performed by: SURGERY

## 2023-07-25 PROCEDURE — 3075F PR MOST RECENT SYSTOLIC BLOOD PRESS GE 130-139MM HG: ICD-10-PCS | Mod: CPTII,S$GLB,, | Performed by: SURGERY

## 2023-07-25 PROCEDURE — 99999 PR PBB SHADOW E&M-EST. PATIENT-LVL IV: ICD-10-PCS | Mod: PBBFAC,,, | Performed by: SURGERY

## 2023-07-25 PROCEDURE — 99203 PR OFFICE/OUTPT VISIT, NEW, LEVL III, 30-44 MIN: ICD-10-PCS | Mod: S$GLB,,, | Performed by: SURGERY

## 2023-07-25 PROCEDURE — 3008F PR BODY MASS INDEX (BMI) DOCUMENTED: ICD-10-PCS | Mod: CPTII,S$GLB,, | Performed by: SURGERY

## 2023-07-25 PROCEDURE — 3288F FALL RISK ASSESSMENT DOCD: CPT | Mod: CPTII,S$GLB,, | Performed by: SURGERY

## 2023-07-25 PROCEDURE — 99203 OFFICE O/P NEW LOW 30 MIN: CPT | Mod: S$GLB,,, | Performed by: SURGERY

## 2023-07-25 PROCEDURE — 1159F MED LIST DOCD IN RCRD: CPT | Mod: CPTII,S$GLB,, | Performed by: SURGERY

## 2023-07-25 PROCEDURE — 1126F AMNT PAIN NOTED NONE PRSNT: CPT | Mod: CPTII,S$GLB,, | Performed by: SURGERY

## 2023-07-25 RX ORDER — PANTOPRAZOLE SODIUM 40 MG/1
40 TABLET, DELAYED RELEASE ORAL
COMMUNITY
Start: 2023-07-06 | End: 2023-11-15

## 2023-07-25 RX ORDER — MELOXICAM 7.5 MG/1
7.5 TABLET ORAL DAILY PRN
COMMUNITY
Start: 2023-07-11 | End: 2023-11-15

## 2023-07-25 RX ORDER — ATORVASTATIN CALCIUM 40 MG/1
40 TABLET, FILM COATED ORAL
COMMUNITY
Start: 2023-06-30

## 2023-07-25 RX ORDER — ALBUTEROL SULFATE 90 UG/1
AEROSOL, METERED RESPIRATORY (INHALATION)
COMMUNITY

## 2023-07-25 RX ORDER — AMOXICILLIN 875 MG/1
875 TABLET, FILM COATED ORAL 2 TIMES DAILY
COMMUNITY
Start: 2023-07-24 | End: 2023-11-15

## 2023-07-25 RX ORDER — CLOPIDOGREL BISULFATE 75 MG/1
75 TABLET ORAL DAILY
COMMUNITY
Start: 2023-07-06

## 2023-07-25 RX ORDER — FINASTERIDE 5 MG/1
5 TABLET, FILM COATED ORAL
COMMUNITY
Start: 2023-07-09 | End: 2023-11-15

## 2023-07-25 NOTE — PROGRESS NOTES
Subjective:       Patient ID: Lupillo Caldera Sr. is a 72 y.o. male.    Chief Complaint: Mass    Review of patient's allergies indicates:  No Known Allergies  72-year-old male referred to me for left breast mass.  He saw his primary doctor.  Ultrasound was ordered.  Patient has what appears to be left breast lipoma x2.  He only feels 1 of them.  Not bothering him.  No pain.  Also has multiple abdominal wall lipomas that he was unaware he had.  I explained to him that this is quite common and nothing to do unless it really bothers him.  He is not interested in surgery.  I do not think anything needs to be done.  No biopsy needed.  It is fairly straight forward lipomas.  Patient can follow up as needed.  Past Medical History:   Diagnosis Date    Dehydration     Foot drop, right     Hyperlipidemia     Hypertension     Prostate infection     Radiculopathy, lumbar region     UTI (urinary tract infection)      Past Surgical History:   Procedure Laterality Date    BACK SURGERY      x2    CARPAL TUNNEL RELEASE      Left hand    KNEE SURGERY      TONSILLECTOMY, ADENOIDECTOMY      TOTAL KNEE ARTHROPLASTY      right knee     Family History   Problem Relation Age of Onset    Heart disease Father      Social History     Socioeconomic History    Marital status:    Tobacco Use    Smoking status: Former     Packs/day: 1.50     Years: 6.00     Pack years: 9.00     Types: Cigarettes     Quit date: 1980     Years since quittin.5    Smokeless tobacco: Never   Substance and Sexual Activity    Alcohol use: Yes     Comment: occasionally    Drug use: No     Vitals:    23 1005   BP: 136/68   Pulse: 82   Resp: 18       Review of Systems   All other systems reviewed and are negative.    Objective:      Physical Exam  Vitals and nursing note reviewed.   Constitutional:       Appearance: He is well-developed.   HENT:      Head: Normocephalic.   Eyes:      Pupils: Pupils are equal, round, and reactive to light.   Pulmonary:       Effort: Pulmonary effort is normal.   Abdominal:      Palpations: Abdomen is soft.   Musculoskeletal:         General: Normal range of motion.      Cervical back: Normal range of motion.   Skin:     General: Skin is warm and dry.      Comments: Patient with multiple abdominal wall lipomas and left breast lipoma.  Soft and well-circumscribed.  Easily movable.  Consistent with lipoma.   Neurological:      Mental Status: He is alert and oriented to person, place, and time.       Assessment:       1. Lipoma of breast        Plan:         Lupillo was seen today for mass.    Diagnoses and all orders for this visit:    Lipoma of breast    Offered surgical excision but I do not think it is necessary.  Patient not interested in surgery.  Follow-up if the lesion start to get larger or cause him any type of discomfort.    No follow-ups on file.          Ady Kaba Jr, MD

## 2023-08-16 NOTE — TELEPHONE ENCOUNTER
Patient would like to go to [facility] in [place] for Therapy instead of seeing pain management at this time. Please place order. Thanks     Sski Pregnancy And Lactation Text: This medication is Pregnancy Category D and isn't considered safe during pregnancy. It is excreted in breast milk.

## 2023-08-19 DIAGNOSIS — M54.16 LUMBAR RADICULOPATHY: ICD-10-CM

## 2023-08-22 RX ORDER — GABAPENTIN 600 MG/1
600 TABLET ORAL 2 TIMES DAILY
Qty: 180 TABLET | Refills: 0 | Status: SHIPPED | OUTPATIENT
Start: 2023-08-22 | End: 2023-11-15 | Stop reason: SDUPTHER

## 2023-08-27 DIAGNOSIS — M54.16 LUMBAR RADICULOPATHY: ICD-10-CM

## 2023-08-27 DIAGNOSIS — G62.9 NEUROPATHY: ICD-10-CM

## 2023-08-28 NOTE — TELEPHONE ENCOUNTER
"PER LAST CHART NOTE ON 8/18/2022:  "4. Continue Cymbalta for radicular complaints and neuropathic complaints. He couldn't tolerate 60 mg dosing, due to excess dreaming and limb movements. "    F/U SCHEDULED IN A FEW MONTHS   "

## 2023-08-29 ENCOUNTER — TELEPHONE (OUTPATIENT)
Dept: NEUROLOGY | Facility: CLINIC | Age: 73
End: 2023-08-29
Payer: MEDICARE

## 2023-08-29 RX ORDER — DULOXETIN HYDROCHLORIDE 30 MG/1
30 CAPSULE, DELAYED RELEASE ORAL
Qty: 90 CAPSULE | Refills: 0 | Status: SHIPPED | OUTPATIENT
Start: 2023-08-29 | End: 2023-11-15 | Stop reason: SDUPTHER

## 2023-11-15 ENCOUNTER — OFFICE VISIT (OUTPATIENT)
Dept: NEUROLOGY | Facility: CLINIC | Age: 73
End: 2023-11-15
Payer: MEDICARE

## 2023-11-15 VITALS
WEIGHT: 250.25 LBS | DIASTOLIC BLOOD PRESSURE: 68 MMHG | HEART RATE: 70 BPM | HEIGHT: 70 IN | RESPIRATION RATE: 12 BRPM | SYSTOLIC BLOOD PRESSURE: 124 MMHG | OXYGEN SATURATION: 95 % | BODY MASS INDEX: 35.83 KG/M2

## 2023-11-15 DIAGNOSIS — G62.9 NEUROPATHY: Primary | ICD-10-CM

## 2023-11-15 DIAGNOSIS — G25.81 RESTLESS LEG SYNDROME: ICD-10-CM

## 2023-11-15 DIAGNOSIS — I10 HYPERTENSION, UNSPECIFIED TYPE: ICD-10-CM

## 2023-11-15 DIAGNOSIS — M21.379 FOOT-DROP, UNSPECIFIED LATERALITY: ICD-10-CM

## 2023-11-15 DIAGNOSIS — M79.604 RIGHT LEG PAIN: ICD-10-CM

## 2023-11-15 DIAGNOSIS — M54.16 LUMBAR RADICULOPATHY: ICD-10-CM

## 2023-11-15 PROCEDURE — 99999 PR PBB SHADOW E&M-EST. PATIENT-LVL III: ICD-10-PCS | Mod: PBBFAC,,, | Performed by: NURSE PRACTITIONER

## 2023-11-15 PROCEDURE — 99999 PR PBB SHADOW E&M-EST. PATIENT-LVL III: CPT | Mod: PBBFAC,,, | Performed by: NURSE PRACTITIONER

## 2023-11-15 PROCEDURE — 1101F PR PT FALLS ASSESS DOC 0-1 FALLS W/OUT INJ PAST YR: ICD-10-PCS | Mod: CPTII,S$GLB,, | Performed by: NURSE PRACTITIONER

## 2023-11-15 PROCEDURE — 3288F PR FALLS RISK ASSESSMENT DOCUMENTED: ICD-10-PCS | Mod: CPTII,S$GLB,, | Performed by: NURSE PRACTITIONER

## 2023-11-15 PROCEDURE — 3008F BODY MASS INDEX DOCD: CPT | Mod: CPTII,S$GLB,, | Performed by: NURSE PRACTITIONER

## 2023-11-15 PROCEDURE — 99214 OFFICE O/P EST MOD 30 MIN: CPT | Mod: S$GLB,,, | Performed by: NURSE PRACTITIONER

## 2023-11-15 PROCEDURE — 4010F PR ACE/ARB THEARPY RXD/TAKEN: ICD-10-PCS | Mod: CPTII,S$GLB,, | Performed by: NURSE PRACTITIONER

## 2023-11-15 PROCEDURE — 1126F PR PAIN SEVERITY QUANTIFIED, NO PAIN PRESENT: ICD-10-PCS | Mod: CPTII,S$GLB,, | Performed by: NURSE PRACTITIONER

## 2023-11-15 PROCEDURE — 1159F PR MEDICATION LIST DOCUMENTED IN MEDICAL RECORD: ICD-10-PCS | Mod: CPTII,S$GLB,, | Performed by: NURSE PRACTITIONER

## 2023-11-15 PROCEDURE — 99214 PR OFFICE/OUTPT VISIT, EST, LEVL IV, 30-39 MIN: ICD-10-PCS | Mod: S$GLB,,, | Performed by: NURSE PRACTITIONER

## 2023-11-15 PROCEDURE — 3078F PR MOST RECENT DIASTOLIC BLOOD PRESSURE < 80 MM HG: ICD-10-PCS | Mod: CPTII,S$GLB,, | Performed by: NURSE PRACTITIONER

## 2023-11-15 PROCEDURE — 3074F SYST BP LT 130 MM HG: CPT | Mod: CPTII,S$GLB,, | Performed by: NURSE PRACTITIONER

## 2023-11-15 PROCEDURE — 3074F PR MOST RECENT SYSTOLIC BLOOD PRESSURE < 130 MM HG: ICD-10-PCS | Mod: CPTII,S$GLB,, | Performed by: NURSE PRACTITIONER

## 2023-11-15 PROCEDURE — 3078F DIAST BP <80 MM HG: CPT | Mod: CPTII,S$GLB,, | Performed by: NURSE PRACTITIONER

## 2023-11-15 PROCEDURE — 4010F ACE/ARB THERAPY RXD/TAKEN: CPT | Mod: CPTII,S$GLB,, | Performed by: NURSE PRACTITIONER

## 2023-11-15 PROCEDURE — 3008F PR BODY MASS INDEX (BMI) DOCUMENTED: ICD-10-PCS | Mod: CPTII,S$GLB,, | Performed by: NURSE PRACTITIONER

## 2023-11-15 PROCEDURE — 1159F MED LIST DOCD IN RCRD: CPT | Mod: CPTII,S$GLB,, | Performed by: NURSE PRACTITIONER

## 2023-11-15 PROCEDURE — 3288F FALL RISK ASSESSMENT DOCD: CPT | Mod: CPTII,S$GLB,, | Performed by: NURSE PRACTITIONER

## 2023-11-15 PROCEDURE — 1101F PT FALLS ASSESS-DOCD LE1/YR: CPT | Mod: CPTII,S$GLB,, | Performed by: NURSE PRACTITIONER

## 2023-11-15 PROCEDURE — 1126F AMNT PAIN NOTED NONE PRSNT: CPT | Mod: CPTII,S$GLB,, | Performed by: NURSE PRACTITIONER

## 2023-11-15 RX ORDER — DULOXETIN HYDROCHLORIDE 30 MG/1
30 CAPSULE, DELAYED RELEASE ORAL DAILY
Qty: 90 CAPSULE | Refills: 3 | Status: SHIPPED | OUTPATIENT
Start: 2023-11-15

## 2023-11-15 RX ORDER — CLOBETASOL PROPIONATE 0.5 MG/G
CREAM TOPICAL 2 TIMES DAILY PRN
COMMUNITY
Start: 2023-10-23

## 2023-11-15 RX ORDER — IMIQUIMOD 12.5 MG/.25G
CREAM TOPICAL DAILY PRN
COMMUNITY
Start: 2023-11-13

## 2023-11-15 RX ORDER — BACLOFEN 10 MG/1
10 TABLET ORAL NIGHTLY
Qty: 90 TABLET | Refills: 3 | Status: SHIPPED | OUTPATIENT
Start: 2023-11-15

## 2023-11-15 RX ORDER — GABAPENTIN 600 MG/1
600 TABLET ORAL 2 TIMES DAILY
Qty: 180 TABLET | Refills: 3 | Status: SHIPPED | OUTPATIENT
Start: 2023-11-15

## 2023-11-15 NOTE — PROGRESS NOTES
HPI :  Lupillo Caldera Sr. is a 72 y.o. male who is s/p 2 lumbar spinal surgeries now with Residual foot drop from right L5 radiduluopathy with ongoing rennervation by  EMG and prominent cramps/spasms in the legs--failed lower back syndrome. Some component of RLS.  2016 MRI L spine showed spondylosis and NF narrowing. 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 He has HTN and HLD.     He presents today for a yearly follow up visit. He fell off a ladder in 5/2023 and fractured multiple ribs-also spleen laceration. He had a closed head injury and LOC. He doesn't recall most information around the time of his accident, but he denies any memory complaints or gait imbalance afterwards.     Denies headaches.     Denies lumbar pain.     RLE pain helped with Gabapentin.     Cymbalta continues to help neuropathic complaints. Some numbness to the tips of his toes some evenings, which is annoying. No falls.     He does use neuropathic complaint for his neuropathy, which is helpful.    Right AFO used well. He has increased medial right ankle pain at the end of the day. He declines referral to Ortho for this.     RLS is well controlled.     CTS right is not active. Thumb pain is responsive to Voltaren.    He is working again in [employer] since his right TKA.     Review of Systems   Constitutional:  Negative for fever.   Eyes:  Negative for double vision.   Respiratory:  Negative for hemoptysis.    Cardiovascular:  Negative for leg swelling.   Gastrointestinal:  Negative for blood in stool.   Genitourinary:  Negative for hematuria.   Musculoskeletal:  Positive for joint pain. Negative for back pain, falls and neck pain.   Skin:  Negative for rash.   Neurological:  Positive for sensory change and focal weakness. Negative for dizziness and headaches.        Right foot drop   Psychiatric/Behavioral:   Negative for memory loss.      Exam:   Gen Appearance, well developed/nourished in no apparent distress  CV: 2+ distal pulses with no edema or swelling  Neuro:  MS: Awake, alert, Sustains attention. Recent/remote memory intact, Language is full to spontaneous speech/comprehension. Fund of Knowledge is full  CN: PERRL, Extraoccular movements and visual fields are full. Normal facial sensation and strength, Tongue and Palate are midline and strong. Shoulder Shrug symmetric and strong.  Motor: Normal bulk, tone decreaed in the right lower leg muscles, no abnormal movements. 5/5 strength bilateral upper/lower extremities with 2+ reflexes except 3/5 right foot and toe extensors (improved with gravity only to 4+/5) (in afo today) only and absent right ankle jerks and no clonus  Sensation:  Intact to temp and and vibration, mildly positive Romberg  Gait: Normal stance, no ataxia with afo in place-- there is no compression on the medial ankle with this brace.     Imagin2020 Bilateral Knee X-rays:   FINDINGS:  There are advanced degenerative changes of the knees greater on the right than the left.  Bony spurring and tricompartment joint space narrowing on the right and medial joint space narrowing on the left. There is advanced patellofemoral joint degenerative changes on the right with probable loose bodies.  There is bilateral chondrocalcinosis.  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     There is no evidence of fracture, dislocation or other acute osseous abnormality.     MRI L-spine 2016:   Status post L4-L5 fusion.  Lumbar spondylosis as above with moderate right neuroforaminal narrowing at L4-5.    EMG 2016:   1. Mild Right Carpal Tunnel Syndrome  2. Chronic Lumbar Radiculopathy at L5 on the right  3. 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      Labs:   2016 CMP, CBC, TSH, B12, SPEP/FRITZ: Unremarkable except mild  elevation in fasting glucose (108)  12/2016 fasting glucose improved with normal A1C    Assessment/Recommendation: Lupillo Caldera Sr. is a 72 y.o. male who is s/p 2 lumbar spinal surgeries now with Residual foot drop from right L5 radiduluopathy with ongoing rennervation by  EMG and prominent cramps/spasms in the legs--failed lower back syndrome. Some component of RLS.  2016 MRI L spine showed spondylosis and NF narrowing. 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 CHI with LOC in 5/2023.      I recommend:  1. Right thumb OA complaints resolved. No longer needing Voltaren gel, which was helpful prior. May restart at any point if needed. This was not consistent with right CTS.     2. His right lumbar complaints resolved with PT per [facility] prior.     3. He continues compliance with right AFO for right foot drop.    4. Continue Cymbalta for radicular complaints and neuropathic complaints. He couldn't tolerate 60 mg dosing, due to excess dreaming and limb movements.   Continue Gabapentin 600 mg bid for RLE pain, which is very effective. He is now tolerating well.     5. Continue Baclofen for RLS complaints. Switching statin (with another provider) also helped prior.     6. He reports failing left CTR prior. No specific treatment needed for right CTS at this time.     7. Fasting blood sugar abnormality note prior had improved. Advised yearly lab follow up with PCP or Cardiologist.     8. Now s/p right knee replacement with great improvement to right knee pain.     9. Request all records from [facility] regarding CHI with LOC in 5/2023. Patient denies any ICH or post concussive complaints.   -fall precautions discussed. Patient no longer climbs ladders.     FU 1 year

## 2024-11-21 DIAGNOSIS — M54.16 LUMBAR RADICULOPATHY: ICD-10-CM

## 2024-11-21 DIAGNOSIS — G62.9 NEUROPATHY: ICD-10-CM

## 2024-11-21 RX ORDER — DULOXETIN HYDROCHLORIDE 30 MG/1
30 CAPSULE, DELAYED RELEASE ORAL
Qty: 90 CAPSULE | Refills: 0 | Status: SHIPPED | OUTPATIENT
Start: 2024-11-21

## 2024-11-27 DIAGNOSIS — M54.16 LUMBAR RADICULOPATHY: ICD-10-CM

## 2024-12-03 RX ORDER — GABAPENTIN 600 MG/1
600 TABLET ORAL 2 TIMES DAILY
Qty: 180 TABLET | Refills: 0 | Status: SHIPPED | OUTPATIENT
Start: 2024-12-03

## 2024-12-09 DIAGNOSIS — G25.81 RESTLESS LEG SYNDROME: ICD-10-CM

## 2024-12-10 RX ORDER — BACLOFEN 10 MG/1
10 TABLET ORAL NIGHTLY
Qty: 90 TABLET | Refills: 0 | Status: SHIPPED | OUTPATIENT
Start: 2024-12-10

## 2025-02-13 ENCOUNTER — OFFICE VISIT (OUTPATIENT)
Dept: NEUROLOGY | Facility: CLINIC | Age: 75
End: 2025-02-13
Payer: MEDICARE

## 2025-02-13 VITALS
SYSTOLIC BLOOD PRESSURE: 130 MMHG | BODY MASS INDEX: 37.03 KG/M2 | HEART RATE: 96 BPM | HEIGHT: 70 IN | WEIGHT: 258.69 LBS | RESPIRATION RATE: 16 BRPM | OXYGEN SATURATION: 84 % | DIASTOLIC BLOOD PRESSURE: 68 MMHG

## 2025-02-13 DIAGNOSIS — R20.0 RIGHT LEG NUMBNESS: ICD-10-CM

## 2025-02-13 DIAGNOSIS — G25.81 RESTLESS LEG SYNDROME: ICD-10-CM

## 2025-02-13 DIAGNOSIS — M21.379 FOOT-DROP, UNSPECIFIED LATERALITY: ICD-10-CM

## 2025-02-13 DIAGNOSIS — G62.9 NEUROPATHY: Primary | ICD-10-CM

## 2025-02-13 DIAGNOSIS — M51.9 LUMBAR DISC DISEASE: ICD-10-CM

## 2025-02-13 DIAGNOSIS — M79.604 RIGHT LEG PAIN: ICD-10-CM

## 2025-02-13 DIAGNOSIS — F40.240 CLAUSTROPHOBIA: ICD-10-CM

## 2025-02-13 DIAGNOSIS — M54.16 LUMBAR RADICULOPATHY: ICD-10-CM

## 2025-02-13 PROCEDURE — 3075F SYST BP GE 130 - 139MM HG: CPT | Mod: CPTII,S$GLB,, | Performed by: NURSE PRACTITIONER

## 2025-02-13 PROCEDURE — 4010F ACE/ARB THERAPY RXD/TAKEN: CPT | Mod: CPTII,S$GLB,, | Performed by: NURSE PRACTITIONER

## 2025-02-13 PROCEDURE — 3008F BODY MASS INDEX DOCD: CPT | Mod: CPTII,S$GLB,, | Performed by: NURSE PRACTITIONER

## 2025-02-13 PROCEDURE — 3078F DIAST BP <80 MM HG: CPT | Mod: CPTII,S$GLB,, | Performed by: NURSE PRACTITIONER

## 2025-02-13 PROCEDURE — 1126F AMNT PAIN NOTED NONE PRSNT: CPT | Mod: CPTII,S$GLB,, | Performed by: NURSE PRACTITIONER

## 2025-02-13 PROCEDURE — 99214 OFFICE O/P EST MOD 30 MIN: CPT | Mod: S$GLB,,, | Performed by: NURSE PRACTITIONER

## 2025-02-13 PROCEDURE — 99999 PR PBB SHADOW E&M-EST. PATIENT-LVL IV: CPT | Mod: PBBFAC,,, | Performed by: NURSE PRACTITIONER

## 2025-02-13 PROCEDURE — 1159F MED LIST DOCD IN RCRD: CPT | Mod: CPTII,S$GLB,, | Performed by: NURSE PRACTITIONER

## 2025-02-13 RX ORDER — PANTOPRAZOLE SODIUM 40 MG/1
40 TABLET, DELAYED RELEASE ORAL
COMMUNITY
Start: 2025-01-10

## 2025-02-13 RX ORDER — FINASTERIDE 5 MG/1
5 TABLET, FILM COATED ORAL
COMMUNITY
Start: 2025-01-19

## 2025-02-13 RX ORDER — GABAPENTIN 600 MG/1
600 TABLET ORAL 2 TIMES DAILY
Qty: 180 TABLET | Refills: 1 | Status: SHIPPED | OUTPATIENT
Start: 2025-02-13

## 2025-02-13 RX ORDER — DIAZEPAM 5 MG/1
5 TABLET ORAL SEE ADMIN INSTRUCTIONS
Qty: 2 TABLET | Refills: 0 | Status: SHIPPED | OUTPATIENT
Start: 2025-02-13 | End: 2025-03-15

## 2025-02-13 RX ORDER — BACLOFEN 10 MG/1
10 TABLET ORAL NIGHTLY
Qty: 90 TABLET | Refills: 1 | Status: SHIPPED | OUTPATIENT
Start: 2025-02-13

## 2025-02-13 RX ORDER — CETIRIZINE HYDROCHLORIDE 10 MG/1
10 TABLET ORAL DAILY
COMMUNITY

## 2025-02-13 RX ORDER — MELOXICAM 7.5 MG/1
7.5 TABLET ORAL
COMMUNITY
Start: 2025-02-07

## 2025-02-13 RX ORDER — AMLODIPINE BESYLATE 5 MG/1
5 TABLET ORAL
COMMUNITY
Start: 2024-12-24

## 2025-02-13 RX ORDER — ATORVASTATIN CALCIUM 80 MG/1
80 TABLET, FILM COATED ORAL
COMMUNITY
Start: 2025-01-11

## 2025-02-13 RX ORDER — DULOXETIN HYDROCHLORIDE 30 MG/1
30 CAPSULE, DELAYED RELEASE ORAL DAILY
Qty: 90 CAPSULE | Refills: 1 | Status: SHIPPED | OUTPATIENT
Start: 2025-02-13

## 2025-02-13 NOTE — PROGRESS NOTES
HPI :  Lupillo Caldera Sr. is a 74 y.o. male who is s/p 2 lumbar spinal surgeries now with Residual foot drop from right L5 radiculopathy with ongoing rennervation by  EMG and prominent cramps/spasms in the legs--failed lower back syndrome. Some component of RLS.  2016 MRI L spine showed spondylosis and NF narrowing. 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 He has HTN and HLD. Fall with multiple rib fractures and spleen laceration in 5/2023.     He presents today for a yearly follow up visit. He has more lumbar pain currently, aggravating by standing for a prolonged period of time. He has more numbness to the RLE. Denies pain in the RLS.     Denies falls. He has new onset of urge incontinence. He feels that he isn't able to make it to the restroom in time. This is a new complaint.     Cymbalta continues to help neuropathic complaints. Some numbness to the tips of his toes some evenings, which is annoying.     He also continues on Gabapentin.     He is not needing neuropathic cream anymore. He takes his medications earlier and this is more effective.     Right AFO used well. He has increased medial right ankle pain at the end of the day. He declines referral to Ortho for this.     RLS is well controlled.     CTS right is not active. Thumb pain is responsive to Voltaren.    He is working again in [employer] since his right TKA.     Review of Systems   Constitutional:  Negative for fever.   Eyes:  Negative for double vision.   Respiratory:  Negative for hemoptysis.    Cardiovascular:  Negative for leg swelling.   Gastrointestinal:  Negative for blood in stool.   Genitourinary:  Positive for frequency. Negative for hematuria.   Musculoskeletal:  Positive for back pain and joint pain. Negative for falls and neck pain.   Skin:  Negative for rash.   Neurological:  Positive for sensory change and  focal weakness. Negative for dizziness and headaches.        Right foot drop   Psychiatric/Behavioral:  Negative for memory loss.      Exam:   Gen Appearance, well developed/nourished in no apparent distress  CV: 2+ distal pulses with no edema or swelling  Neuro:  MS: Awake, alert, Sustains attention. Recent/remote memory intact, Language is full to spontaneous speech/comprehension. Fund of Knowledge is full  CN: PERRL, Extraoccular movements and visual fields are full. Normal facial sensation and strength, Tongue and Palate are midline and strong. Shoulder Shrug symmetric and strong.  Motor: Normal bulk, tone decreaed in the right lower leg muscles, no abnormal movements. 5/5 strength bilateral upper/lower extremities with 1+ reflexes except 3/5 right foot and toe extensors (improved with gravity only to 4+/5) (in afo today) only and absent right ankle jerks and no clonus  Sensation:  Intact to temp and and vibration, mildly positive Romberg  Gait: Normal stance, no ataxia with afo in place-- there is no compression on the medial ankle with this brace.     Imagin2020 Bilateral Knee X-rays:   FINDINGS:  There are advanced degenerative changes of the knees greater on the right than the left.  Bony spurring and tricompartment joint space narrowing on the right and medial joint space narrowing on the left. There is advanced patellofemoral joint degenerative changes on the right with probable loose bodies.  There is bilateral chondrocalcinosis.  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     There is no evidence of fracture, dislocation or other acute osseous abnormality.     MRI L-spine 2016:   Status post L4-L5 fusion.  Lumbar spondylosis as above with moderate right neuroforaminal narrowing at L4-5.    EMG 2016:   1. Mild Right Carpal Tunnel Syndrome  2. Chronic Lumbar Radiculopathy at L5 on the right  3. 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      Labs:   2016 CMP, CBC, TSH, B12, SPEP/FRITZ: Unremarkable except mild elevation in fasting glucose (108)  12/2016 fasting glucose improved with normal A1C    Assessment/Recommendation: Lupillo Caldera Sr. is a 74 y.o. male who is s/p 2 lumbar spinal surgeries now with Residual foot drop from right L5 radiduluopathy with ongoing rennervation by  EMG and prominent cramps/spasms in the legs--failed lower back syndrome. Some component of RLS.  2016 MRI L spine showed spondylosis and NF narrowing. 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 CHI with LOC in 5/2023.      I recommend:  1. Right thumb OA complaints resolved. No longer needing Voltaren gel, which was helpful prior. May restart at any point if needed. This was not consistent with right CTS.     2. Update MRI L-spine to evaluate for any structural changes, given worsening lumbar pain and RLE numbness. He also has new onset of urinary frequency/urge incontinence. If MRI Lumbar spine fails to explain his new urinary complaints, I will refer him to Urology at [facility] for further evaluation.   -note history of L-spine surgery.   His right lumbar complaints resolved with PT per Paulina Gibbons prior. He would prefer conservative measures if possible, rather than a pain management referral.     3. He continues compliance with right AFO for right foot drop.  -he sees Podiatry periodically also.     4. Continue Cymbalta for radicular complaints and neuropathic complaints. He couldn't tolerate 60 mg dosing, due to excess dreaming and limb movements.   Continue Gabapentin 600 mg bid for RLE pain, which is very effective. He is now tolerating well.     5. Continue Baclofen for RLS complaints. Switching statin (with another provider) also helped prior.     6. He reports failing left CTR prior. No specific treatment  needed for right CTS at this time.     7. Fasting blood sugar abnormality note prior had improved. Advised yearly lab follow up with PCP or Cardiologist.     8. Now s/p right knee replacement with great improvement to right knee pain.     9. CHI with LOC in 5/2023. Evaluated at [facility] with unremarkable Neuroimaging per patient. Patient denies any ICH or post concussive complaints.   -fall precautions discussed. Patient no longer climbs ladders.     FU 3 months/will call with resutls and any changes to the POC.

## 2025-02-25 ENCOUNTER — HOSPITAL ENCOUNTER (OUTPATIENT)
Dept: RADIOLOGY | Facility: HOSPITAL | Age: 75
Discharge: HOME | End: 2025-02-25
Attending: NURSE PRACTITIONER
Payer: MEDICARE

## 2025-02-25 ENCOUNTER — RESULTS FOLLOW-UP (OUTPATIENT)
Dept: NEUROLOGY | Facility: CLINIC | Age: 75
End: 2025-02-25
Payer: MEDICARE

## 2025-02-25 DIAGNOSIS — M54.16 LUMBAR RADICULOPATHY: Primary | ICD-10-CM

## 2025-02-25 DIAGNOSIS — N39.41 URGE INCONTINENCE OF URINE: ICD-10-CM

## 2025-02-25 DIAGNOSIS — R20.0 RIGHT LEG NUMBNESS: ICD-10-CM

## 2025-02-25 DIAGNOSIS — M51.9 LUMBAR DISC DISEASE: ICD-10-CM

## 2025-02-25 DIAGNOSIS — M54.16 LUMBAR RADICULOPATHY: ICD-10-CM

## 2025-02-25 PROCEDURE — 72148 MRI LUMBAR SPINE W/O DYE: CPT | Mod: TC

## 2025-02-25 PROCEDURE — 99999 PR PBB SHADOW E&M-EST. PATIENT-LVL I: CPT | Mod: PBBFAC,,, | Performed by: NURSE PRACTITIONER

## 2025-02-25 PROCEDURE — 72148 MRI LUMBAR SPINE W/O DYE: CPT | Mod: 26,,, | Performed by: RADIOLOGY

## 2025-03-18 ENCOUNTER — OFFICE VISIT (OUTPATIENT)
Dept: SURGERY | Facility: CLINIC | Age: 75
End: 2025-03-18
Payer: MEDICARE

## 2025-03-18 VITALS
HEART RATE: 77 BPM | SYSTOLIC BLOOD PRESSURE: 128 MMHG | DIASTOLIC BLOOD PRESSURE: 74 MMHG | BODY MASS INDEX: 37.22 KG/M2 | WEIGHT: 260 LBS | OXYGEN SATURATION: 97 % | HEIGHT: 70 IN | RESPIRATION RATE: 16 BRPM

## 2025-03-18 DIAGNOSIS — D17.1 LIPOMA OF BREAST: Primary | ICD-10-CM

## 2025-03-18 PROCEDURE — 1160F RVW MEDS BY RX/DR IN RCRD: CPT | Mod: CPTII,S$GLB,, | Performed by: SURGERY

## 2025-03-18 PROCEDURE — 3078F DIAST BP <80 MM HG: CPT | Mod: CPTII,S$GLB,, | Performed by: SURGERY

## 2025-03-18 PROCEDURE — 99213 OFFICE O/P EST LOW 20 MIN: CPT | Mod: S$GLB,,, | Performed by: SURGERY

## 2025-03-18 PROCEDURE — 4010F ACE/ARB THERAPY RXD/TAKEN: CPT | Mod: CPTII,S$GLB,, | Performed by: SURGERY

## 2025-03-18 PROCEDURE — 3074F SYST BP LT 130 MM HG: CPT | Mod: CPTII,S$GLB,, | Performed by: SURGERY

## 2025-03-18 PROCEDURE — 3008F BODY MASS INDEX DOCD: CPT | Mod: CPTII,S$GLB,, | Performed by: SURGERY

## 2025-03-18 PROCEDURE — 99999 PR PBB SHADOW E&M-EST. PATIENT-LVL III: CPT | Mod: PBBFAC,,, | Performed by: SURGERY

## 2025-03-18 PROCEDURE — 1159F MED LIST DOCD IN RCRD: CPT | Mod: CPTII,S$GLB,, | Performed by: SURGERY

## 2025-03-18 NOTE — PROGRESS NOTES
Subjective:       Patient ID: Lupillo Caldera Sr. is a 74 y.o. male.    Chief Complaint: Lump (Painful lump left breast)    Review of patient's allergies indicates:  No Known Allergies  Patient is a 74-year-old male referred to me for left breast lipomas.  Patient had an ultrasound back in July of 2023 that showed 2 lipomas of the left breast.  Patient has been asymptomatic up until about 1 month ago where he started having some left breast pain.  He denies any marijuana usage.  Patient does report that the left breast used to hurt him constantly but now it only hurts him if he hits it or leans up against anything.  I do not feel anything on physical exam.  I do not feel any lipomas.  Left breast is slightly larger than the right.  He does have bilateral gynecomastia.  I just reassured he and his wife that there is nothing to do surgically.  I do not feel anything to remove.  He has only been hurting for about 1 month now and I told him that chances are that the pain and discomfort we will likely go away.  Again, he states that it used to hurt him constantly when it started and more recently only causes him some pain or discomfort if he rubs it or hits against anything.  Past Medical History:   Diagnosis Date    Dehydration     Foot drop, right     Hyperlipidemia     Hypertension     Prostate infection     Radiculopathy, lumbar region     UTI (urinary tract infection)      Past Surgical History:   Procedure Laterality Date    BACK SURGERY      x2    CARPAL TUNNEL RELEASE      Left hand    KNEE SURGERY      TONSILLECTOMY, ADENOIDECTOMY      TOTAL KNEE ARTHROPLASTY      right knee     Family History   Problem Relation Name Age of Onset    Heart disease Father       Social History[1]  Vitals:    03/18/25 0858   BP: 128/74   Pulse: 77   Resp: 16       Review of Systems   All other systems reviewed and are negative.    Objective:      Physical Exam  Vitals and nursing note reviewed.   Constitutional:       Appearance: He  is well-developed.   HENT:      Head: Normocephalic.   Eyes:      Pupils: Pupils are equal, round, and reactive to light.   Pulmonary:      Effort: Pulmonary effort is normal.   Abdominal:      Palpations: Abdomen is soft.   Musculoskeletal:         General: Normal range of motion.      Cervical back: Normal range of motion.   Skin:     General: Skin is warm and dry.      Comments: I do not feel any lipomas in the left breast.  Left breast slightly larger than the right.  He does have some bilateral gynecomastia.   Neurological:      Mental Status: He is alert and oriented to person, place, and time.       Assessment:       1. Lipoma of breast        Plan:         Lupillo was seen today for lump.    Diagnoses and all orders for this visit:    Lipoma of breast    Nothing surgical to do at this time.  Just reassured patient and wife.  Pain will likely go away on its own.  He does take meloxicam for arthritis.  This may help.    No follow-ups on file.          Ady Kaba Jr, MD           [1]   Social History  Socioeconomic History    Marital status:    Tobacco Use    Smoking status: Former     Current packs/day: 0.00     Average packs/day: 1.5 packs/day for 6.0 years (9.0 ttl pk-yrs)     Types: Cigarettes     Start date: 1974     Quit date: 1980     Years since quittin.2    Smokeless tobacco: Never   Substance and Sexual Activity    Alcohol use: Not Currently    Drug use: No

## 2025-08-16 DIAGNOSIS — G62.9 NEUROPATHY: ICD-10-CM

## 2025-08-16 DIAGNOSIS — M54.16 LUMBAR RADICULOPATHY: ICD-10-CM

## 2025-08-19 RX ORDER — DULOXETIN HYDROCHLORIDE 30 MG/1
30 CAPSULE, DELAYED RELEASE ORAL DAILY
Qty: 90 CAPSULE | Refills: 1 | Status: SHIPPED | OUTPATIENT
Start: 2025-08-19